# Patient Record
Sex: FEMALE | Race: WHITE | URBAN - METROPOLITAN AREA
[De-identification: names, ages, dates, MRNs, and addresses within clinical notes are randomized per-mention and may not be internally consistent; named-entity substitution may affect disease eponyms.]

---

## 2021-04-06 ENCOUNTER — OFFICE VISIT (OUTPATIENT)
Dept: ENT CLINIC | Age: 36
End: 2021-04-06
Payer: COMMERCIAL

## 2021-04-06 VITALS
BODY MASS INDEX: 19.46 KG/M2 | TEMPERATURE: 98.4 F | HEART RATE: 70 BPM | SYSTOLIC BLOOD PRESSURE: 103 MMHG | DIASTOLIC BLOOD PRESSURE: 59 MMHG | HEIGHT: 67 IN | WEIGHT: 124 LBS

## 2021-04-06 DIAGNOSIS — H90.2 EXTERNAL EAR CONDUCTIVE HEARING LOSS: ICD-10-CM

## 2021-04-06 DIAGNOSIS — H61.23 BILATERAL IMPACTED CERUMEN: Primary | ICD-10-CM

## 2021-04-06 PROCEDURE — 99203 OFFICE O/P NEW LOW 30 MIN: CPT | Performed by: OTOLARYNGOLOGY

## 2021-04-06 PROCEDURE — 69210 REMOVE IMPACTED EAR WAX UNI: CPT | Performed by: OTOLARYNGOLOGY

## 2021-04-06 RX ORDER — CHLORAL HYDRATE 500 MG
3000 CAPSULE ORAL 3 TIMES DAILY
COMMUNITY

## 2021-04-06 RX ORDER — M-VIT,TX,IRON,MINS/CALC/FOLIC 27MG-0.4MG
1 TABLET ORAL DAILY
COMMUNITY

## 2021-04-06 SDOH — HEALTH STABILITY: MENTAL HEALTH: HOW MANY STANDARD DRINKS CONTAINING ALCOHOL DO YOU HAVE ON A TYPICAL DAY?: NOT ASKED

## 2021-04-06 SDOH — HEALTH STABILITY: MENTAL HEALTH: HOW OFTEN DO YOU HAVE A DRINK CONTAINING ALCOHOL?: NOT ASKED

## 2021-04-06 NOTE — PROGRESS NOTES
CHIEF COMPLAINT: Fullness in both ears. HISTORY OF PRESENT ILLNESS:  30 y.o. female who presents with fullness in both ears for many months. Her hearing is muffled. She has some ear pain on the left side, not on the right. He has no otorrhea. PAST MEDICAL HISTORY:   Social History     Tobacco Use   Smoking Status Never Smoker   Smokeless Tobacco Never Used                                                    Social History     Substance and Sexual Activity   Alcohol Use Yes    Comment: socially                                                    Current Outpatient Medications:     Multiple Vitamins-Minerals (THERAPEUTIC MULTIVITAMIN-MINERALS) tablet, Take 1 tablet by mouth daily, Disp: , Rfl:     Omega-3 Fatty Acids (FISH OIL) 1000 MG CAPS, Take 3,000 mg by mouth 3 times daily, Disp: , Rfl:     Probiotic Product (PROBIOTIC-10 PO), Take by mouth, Disp: , Rfl:                                                  Past Medical History:   Diagnosis Date    Headache                                                   History reviewed. No pertinent surgical history. FAMILY HISTORY: Family history reviewed. Except as noted in history of present illness, there is no pertinent family history      REVIEW OF SYSTEMS:  All pertinent positive and negative review of systems included in HPI. Otherwise, all systems are reviewed and negative. PHYSICAL EXAMINATION:   GENERAL: wdwn- no acute distress  RESPIRATORY:  No stridor or respiratory distress  COMMUNICATION :  Normal voice  MENTAL STATUS:  Mood and affect normal, oriented X 3  HEAD AND FACE:  No abnormalities of the skin of face or head  EXTERNAL EARS AND NOSE:  Normal pinnae bilateral  FACIAL MUSCLES:  All branches of facial nerve intact  EXTRAOCULAR MUSCLES: Intact with full range of motion  FACE PALPATION:  No tenderness over sinuses. Zygomatic arches and orbital rims intact  OTOSCOPY: Cerumen occludes both external auditory canals.   TUNING FORKS: Rinne ++ Kendall midline at 512 Hz  INTRANASAL:  Septum midline, turbinates normal, meati clear. LIPS, TEETH, GINGIVA:  Normal mucosa  PHARYNX:  Normal  NECK:  No masses. LYMPHATIC:  No cervical adenopathy  SALIVARY GLANDS:  No swelling or masses in the parotid or submandibular salivary glands  THYROID:  No goiter or thyroid masses. IMPRESSION: Impacted cerumen in both ears. PLAN: Cerumen removed from both external auditory canals using suction. Tympanic membranes and middle ear spaces are normal.  Hearing is subjectively improved. There is a family history of hearing loss in both of her parents had hearing aids early. She will arrange for a baseline audiogram.    FOLLOW-UP: As needed.

## 2021-10-20 ENCOUNTER — OFFICE VISIT (OUTPATIENT)
Dept: INTERNAL MEDICINE CLINIC | Age: 36
End: 2021-10-20
Payer: COMMERCIAL

## 2021-10-20 VITALS
OXYGEN SATURATION: 99 % | DIASTOLIC BLOOD PRESSURE: 80 MMHG | TEMPERATURE: 98.2 F | SYSTOLIC BLOOD PRESSURE: 110 MMHG | BODY MASS INDEX: 19.62 KG/M2 | HEART RATE: 68 BPM | WEIGHT: 125 LBS | HEIGHT: 67 IN

## 2021-10-20 DIAGNOSIS — Z20.828 CONTACT W AND EXPOSURE TO OTH VIRAL COMMUNICABLE DISEASES: ICD-10-CM

## 2021-10-20 DIAGNOSIS — G56.01 CARPAL TUNNEL SYNDROME OF RIGHT WRIST: ICD-10-CM

## 2021-10-20 DIAGNOSIS — Z12.4 CERVICAL CANCER SCREENING: ICD-10-CM

## 2021-10-20 DIAGNOSIS — Z00.00 WELL ADULT EXAM: Primary | ICD-10-CM

## 2021-10-20 DIAGNOSIS — Z00.00 WELL ADULT EXAM: ICD-10-CM

## 2021-10-20 LAB
A/G RATIO: 1.7 (ref 1.1–2.2)
ALBUMIN SERPL-MCNC: 4.6 G/DL (ref 3.4–5)
ALP BLD-CCNC: 42 U/L (ref 40–129)
ALT SERPL-CCNC: 13 U/L (ref 10–40)
ANION GAP SERPL CALCULATED.3IONS-SCNC: 13 MMOL/L (ref 3–16)
AST SERPL-CCNC: 20 U/L (ref 15–37)
BILIRUB SERPL-MCNC: 0.4 MG/DL (ref 0–1)
BUN BLDV-MCNC: 11 MG/DL (ref 7–20)
CALCIUM SERPL-MCNC: 9.3 MG/DL (ref 8.3–10.6)
CHLORIDE BLD-SCNC: 100 MMOL/L (ref 99–110)
CHOLESTEROL, TOTAL: 202 MG/DL (ref 0–199)
CO2: 24 MMOL/L (ref 21–32)
CREAT SERPL-MCNC: 0.8 MG/DL (ref 0.6–1.1)
GFR AFRICAN AMERICAN: >60
GFR NON-AFRICAN AMERICAN: >60
GLOBULIN: 2.7 G/DL
GLUCOSE BLD-MCNC: 94 MG/DL (ref 70–99)
HDLC SERPL-MCNC: 85 MG/DL (ref 40–60)
HEPATITIS C ANTIBODY INTERPRETATION: NORMAL
LDL CHOLESTEROL CALCULATED: 110 MG/DL
POTASSIUM SERPL-SCNC: 4.4 MMOL/L (ref 3.5–5.1)
SARS-COV-2 ANTIBODY, TOTAL: NEGATIVE
SODIUM BLD-SCNC: 137 MMOL/L (ref 136–145)
TOTAL PROTEIN: 7.3 G/DL (ref 6.4–8.2)
TRIGL SERPL-MCNC: 35 MG/DL (ref 0–150)
TSH REFLEX: 1.8 UIU/ML (ref 0.27–4.2)
VLDLC SERPL CALC-MCNC: 7 MG/DL

## 2021-10-20 PROCEDURE — 99385 PREV VISIT NEW AGE 18-39: CPT | Performed by: INTERNAL MEDICINE

## 2021-10-20 SDOH — ECONOMIC STABILITY: TRANSPORTATION INSECURITY
IN THE PAST 12 MONTHS, HAS THE LACK OF TRANSPORTATION KEPT YOU FROM MEDICAL APPOINTMENTS OR FROM GETTING MEDICATIONS?: NO

## 2021-10-20 SDOH — ECONOMIC STABILITY: FOOD INSECURITY: WITHIN THE PAST 12 MONTHS, YOU WORRIED THAT YOUR FOOD WOULD RUN OUT BEFORE YOU GOT MONEY TO BUY MORE.: NEVER TRUE

## 2021-10-20 SDOH — HEALTH STABILITY: PHYSICAL HEALTH: ON AVERAGE, HOW MANY MINUTES DO YOU ENGAGE IN EXERCISE AT THIS LEVEL?: 30 MIN

## 2021-10-20 SDOH — HEALTH STABILITY: PHYSICAL HEALTH: ON AVERAGE, HOW MANY DAYS PER WEEK DO YOU ENGAGE IN MODERATE TO STRENUOUS EXERCISE (LIKE A BRISK WALK)?: 3 DAYS

## 2021-10-20 SDOH — ECONOMIC STABILITY: TRANSPORTATION INSECURITY
IN THE PAST 12 MONTHS, HAS LACK OF TRANSPORTATION KEPT YOU FROM MEETINGS, WORK, OR FROM GETTING THINGS NEEDED FOR DAILY LIVING?: NO

## 2021-10-20 SDOH — ECONOMIC STABILITY: HOUSING INSECURITY
IN THE LAST 12 MONTHS, WAS THERE A TIME WHEN YOU DID NOT HAVE A STEADY PLACE TO SLEEP OR SLEPT IN A SHELTER (INCLUDING NOW)?: NO

## 2021-10-20 SDOH — ECONOMIC STABILITY: INCOME INSECURITY: IN THE LAST 12 MONTHS, WAS THERE A TIME WHEN YOU WERE NOT ABLE TO PAY THE MORTGAGE OR RENT ON TIME?: NO

## 2021-10-20 SDOH — ECONOMIC STABILITY: FOOD INSECURITY: WITHIN THE PAST 12 MONTHS, THE FOOD YOU BOUGHT JUST DIDN'T LAST AND YOU DIDN'T HAVE MONEY TO GET MORE.: NEVER TRUE

## 2021-10-20 ASSESSMENT — SOCIAL DETERMINANTS OF HEALTH (SDOH)
WITHIN THE LAST YEAR, HAVE TO BEEN RAPED OR FORCED TO HAVE ANY KIND OF SEXUAL ACTIVITY BY YOUR PARTNER OR EX-PARTNER?: NO
HOW HARD IS IT FOR YOU TO PAY FOR THE VERY BASICS LIKE FOOD, HOUSING, MEDICAL CARE, AND HEATING?: NOT HARD AT ALL
WITHIN THE LAST YEAR, HAVE YOU BEEN AFRAID OF YOUR PARTNER OR EX-PARTNER?: NO
IN A TYPICAL WEEK, HOW MANY TIMES DO YOU TALK ON THE PHONE WITH FAMILY, FRIENDS, OR NEIGHBORS?: MORE THAN THREE TIMES A WEEK
WITHIN THE LAST YEAR, HAVE YOU BEEN KICKED, HIT, SLAPPED, OR OTHERWISE PHYSICALLY HURT BY YOUR PARTNER OR EX-PARTNER?: NO
HOW OFTEN DO YOU ATTEND CHURCH OR RELIGIOUS SERVICES?: MORE THAN 4 TIMES PER YEAR
DO YOU BELONG TO ANY CLUBS OR ORGANIZATIONS SUCH AS CHURCH GROUPS UNIONS, FRATERNAL OR ATHLETIC GROUPS, OR SCHOOL GROUPS?: NO
HOW OFTEN DO YOU GET TOGETHER WITH FRIENDS OR RELATIVES?: ONCE A WEEK
WITHIN THE LAST YEAR, HAVE YOU BEEN HUMILIATED OR EMOTIONALLY ABUSED IN OTHER WAYS BY YOUR PARTNER OR EX-PARTNER?: NO

## 2021-10-20 ASSESSMENT — PATIENT HEALTH QUESTIONNAIRE - PHQ9
SUM OF ALL RESPONSES TO PHQ9 QUESTIONS 1 & 2: 0
SUM OF ALL RESPONSES TO PHQ QUESTIONS 1-9: 0
SUM OF ALL RESPONSES TO PHQ QUESTIONS 1-9: 0
DEPRESSION UNABLE TO ASSESS: PT REFUSES
2. FEELING DOWN, DEPRESSED OR HOPELESS: 0
SUM OF ALL RESPONSES TO PHQ QUESTIONS 1-9: 0
1. LITTLE INTEREST OR PLEASURE IN DOING THINGS: 0

## 2021-10-20 ASSESSMENT — ENCOUNTER SYMPTOMS
SINUS PRESSURE: 0
COUGH: 0
ABDOMINAL PAIN: 0
CONSTIPATION: 0
SHORTNESS OF BREATH: 0
DIARRHEA: 0
BACK PAIN: 0
SINUS PAIN: 0

## 2021-10-20 ASSESSMENT — LIFESTYLE VARIABLES
HOW OFTEN DO YOU HAVE A DRINK CONTAINING ALCOHOL: MONTHLY OR LESS
HOW MANY STANDARD DRINKS CONTAINING ALCOHOL DO YOU HAVE ON A TYPICAL DAY: 1 OR 2

## 2021-10-20 NOTE — PROGRESS NOTES
10/20/2021    Douglas Lujan (:  1985) marta 29 y.o. female, here for a preventive medicine evaluation. There is no problem list on file for this patient. She thinks that she has carpal tunnel in the right wrist - can get pain/burning in the right hand, numbness sometimes. Sleeping with a wrist brace has helped significantly. She also rest and use the brace if she is active and using her hands a lot. Last pap - 2 years since last visit, not sure if there was a pap, maybe 3 years since pap. No history of abnormal pap. Last tdap in the last 6-8 years    Review of Systems   Constitutional: Negative for fatigue and unexpected weight change. HENT: Negative for sinus pressure and sinus pain. Eyes: Negative for visual disturbance. Respiratory: Negative for cough and shortness of breath. Cardiovascular: Negative for chest pain and leg swelling. Gastrointestinal: Negative for abdominal pain, constipation and diarrhea. Genitourinary: Negative for difficulty urinating and menstrual problem. Musculoskeletal: Negative for arthralgias and back pain. Skin: Negative for rash. Neurological: Negative for weakness and light-headedness. Psychiatric/Behavioral: Negative for dysphoric mood. The patient is not nervous/anxious. Prior to Visit Medications    Medication Sig Taking?  Authorizing Provider   Multiple Vitamins-Minerals (THERAPEUTIC MULTIVITAMIN-MINERALS) tablet Take 1 tablet by mouth daily Yes Historical Provider, MD   Omega-3 Fatty Acids (FISH OIL) 1000 MG CAPS Take 3,000 mg by mouth 3 times daily Yes Historical Provider, MD   Probiotic Product (PROBIOTIC-10 PO) Take by mouth Yes Historical Provider, MD        Allergies   Allergen Reactions    Oxycodone        Past Medical History:   Diagnosis Date    Headache        Past Surgical History:   Procedure Laterality Date    HYSTEROSCOPY         Social History     Socioeconomic History    Marital status:      Spouse name: Not on file    Number of children: Not on file    Years of education: Not on file    Highest education level: Not on file   Occupational History    Not on file   Tobacco Use    Smoking status: Never Smoker    Smokeless tobacco: Never Used   Substance and Sexual Activity    Alcohol use: Yes     Comment: socially    Drug use: Never    Sexual activity: Not on file   Other Topics Concern    Not on file   Social History Narrative    Not on file     Social Determinants of Health     Financial Resource Strain: Low Risk     Difficulty of Paying Living Expenses: Not hard at all   Food Insecurity: No Food Insecurity    Worried About Running Out of Food in the Last Year: Never true    Bell of Food in the Last Year: Never true   Transportation Needs: No Transportation Needs    Lack of Transportation (Medical): No    Lack of Transportation (Non-Medical): No   Physical Activity: Insufficiently Active    Days of Exercise per Week: 3 days    Minutes of Exercise per Session: 30 min   Stress: No Stress Concern Present    Feeling of Stress : Not at all   Social Connections:  Moderately Integrated    Frequency of Communication with Friends and Family: More than three times a week    Frequency of Social Gatherings with Friends and Family: Once a week    Attends Mandaen Services: More than 4 times per year    Active Member of Clubs or Organizations: No    Attends Club or Organization Meetings: Not on file    Marital Status:    Intimate Partner Violence: Not At Risk    Fear of Current or Ex-Partner: No    Emotionally Abused: No    Physically Abused: No    Sexually Abused: No        Family History   Problem Relation Age of Onset    Thyroid Disease Mother         hypothyroidism    Seizures Sister     Diabetes Maternal Grandmother     Heart Disease Maternal Grandfather     Heart Surgery Maternal Grandfather     Pancreatic Cancer Paternal Grandfather     Breast Cancer Paternal Aunt     Thyroid Disease Maternal Aunt         hyperthyroidism       ADVANCEDIRECTIVE: N, <no information>    Vitals:    10/20/21 0956   BP: 110/80   Pulse: 68   Temp: 98.2 °F (36.8 °C)   SpO2: 99%   Weight: 125 lb (56.7 kg)   Height: 5' 6.75\" (1.695 m)     Estimated body mass index is 19.72 kg/m² as calculated from the following:    Height as of this encounter: 5' 6.75\" (1.695 m). Weight as of this encounter: 125 lb (56.7 kg). Physical Exam  Vitals reviewed. Exam conducted with a chaperone present. Constitutional:       General: She is not in acute distress. Appearance: Normal appearance. She is well-developed. HENT:      Head: Normocephalic and atraumatic. Right Ear: Tympanic membrane, ear canal and external ear normal.      Left Ear: Tympanic membrane, ear canal and external ear normal.   Eyes:      General: No scleral icterus. Conjunctiva/sclera: Conjunctivae normal.   Neck:      Thyroid: No thyroid mass, thyromegaly or thyroid tenderness. Vascular: No carotid bruit. Cardiovascular:      Rate and Rhythm: Normal rate and regular rhythm. Heart sounds: Normal heart sounds. Pulmonary:      Effort: Pulmonary effort is normal. No respiratory distress. Breath sounds: Normal breath sounds. Abdominal:      General: Abdomen is flat. Bowel sounds are normal. There is no distension. Palpations: Abdomen is soft. There is no mass. Tenderness: There is no abdominal tenderness. Hernia: No hernia is present. Genitourinary:     General: Normal vulva. Vagina: Normal.      Cervix: Cervical bleeding present. Uterus: Normal.       Adnexa: Right adnexa normal and left adnexa normal.   Musculoskeletal:      Cervical back: Neck supple. Right lower leg: No edema. Left lower leg: No edema. Lymphadenopathy:      Cervical: No cervical adenopathy. Skin:     General: Skin is warm and dry. Neurological:      General: No focal deficit present.       Mental Status: She is alert and oriented to person, place, and time. Psychiatric:         Mood and Affect: Mood normal.         Behavior: Behavior normal.         Thought Content: Thought content normal.         Judgment: Judgment normal.         No flowsheet data found. No results found for: CHOL, CHOLFAST, TRIG, TRIGLYCFAST, HDL, LDLCHOLESTEROL, LDLCALC, GLUF, GLUCOSE, LABA1C    The ASCVD Risk score (Deanna Barnes, et al., 2013) failed to calculate for the following reasons: The 2013 ASCVD risk score is only valid for ages 36 to 78      There is no immunization history on file for this patient. Health Maintenance   Topic Date Due    Hepatitis C screen  Never done    COVID-19 Vaccine (1) Never done    HIV screen  Never done    DTaP/Tdap/Td vaccine (1 - Tdap) Never done    Cervical cancer screen  Never done    Flu vaccine (1) Never done    Hepatitis A vaccine  Aged Out    Hepatitis B vaccine  Aged Out    Hib vaccine  Aged Out    Meningococcal (ACWY) vaccine  Aged Out    Pneumococcal 0-64 years Vaccine  Aged Out    Varicella vaccine  Discontinued       ASSESSMENT/PLAN:  1. Well adult exam  - Discussed age appropriate preventive care including healthy diet, daily exercise, immunizations and age & gender guided screening tests. - Comprehensive Metabolic Panel; Future  - Lipid Panel; Future  - TSH with Reflex; Future  - HIV Screen; Future  - Hepatitis C Antibody; Future    2. Contact w and exposure to oth viral communicable diseases  - Covid-19, Antibody, Total; Future    3. Cervical cancer screening  - just starting menses, pap sent, may need to be repeated but will see results  - PAP SMEAR    4. Carpal tunnel syndrome of right wrist  -Symptoms improved with conservative measures, discussed start injection and surgery, if symptoms are progressing can pursue that    Return in about 1 year (around 10/20/2022) for CPE.

## 2021-10-21 LAB
HIV AG/AB: NORMAL
HIV ANTIGEN: NORMAL
HIV-1 ANTIBODY: NORMAL
HIV-2 AB: NORMAL

## 2022-02-04 ENCOUNTER — TELEPHONE (OUTPATIENT)
Dept: INTERNAL MEDICINE CLINIC | Age: 37
End: 2022-02-04

## 2022-02-04 NOTE — TELEPHONE ENCOUNTER
----- Message from Gunnarrukhsana Nicholsoner sent at 2/4/2022 10:09 AM EST -----  Subject: Message to Provider    QUESTIONS  Information for Provider? Pt needs a negative covid test or a blood test   showing she has antibodies from when she Covid in December. Could you put   in orders for her to get antibody test. Please advise where to get   bloodwork done. Please call to advise.  ---------------------------------------------------------------------------  --------------  CALL BACK INFO  What is the best way for the office to contact you? OK to leave message on   voicemail  Preferred Call Back Phone Number? [phone]  ---------------------------------------------------------------------------  --------------  SCRIPT ANSWERS  Relationship to Patient?  Self

## 2022-02-10 ENCOUNTER — TELEPHONE (OUTPATIENT)
Dept: INTERNAL MEDICINE CLINIC | Age: 37
End: 2022-02-10

## 2022-02-10 NOTE — TELEPHONE ENCOUNTER
Patient is calling regarding a question she has    Patient is on day 3 with excessive itching all over from head to toe  Itching last all day    She was concerned because she  Didn't know if she should make an appt with doctor     Please advise and call

## 2022-02-11 NOTE — TELEPHONE ENCOUNTER
Can put her in Monday 10:20 and over the weekend she can take cetirizine 10mg twice daily, or urgent care

## 2022-02-22 ENCOUNTER — TELEPHONE (OUTPATIENT)
Dept: INTERNAL MEDICINE CLINIC | Age: 37
End: 2022-02-22

## 2022-02-22 NOTE — TELEPHONE ENCOUNTER
Patient called, on day 16 of itching (without a rash) from head to toe. Tried Benadryl and Zyrtec, they did not help. Has not changed any detergents, topicals, or other medications. She saw a dermatologist on Feb 11 th. Last Monday (02/14) the doctor ordered labs for her. She got the labs done on (02/18). Results were all in normal ranges (see lab results). Please ask Dr. Oumar Kendrick to return her call.

## 2022-02-22 NOTE — TELEPHONE ENCOUNTER
Not sure if she's asking next steps - would need to see her, I can't really treat it without seeing her

## 2022-02-23 ENCOUNTER — OFFICE VISIT (OUTPATIENT)
Dept: INTERNAL MEDICINE CLINIC | Age: 37
End: 2022-02-23
Payer: COMMERCIAL

## 2022-02-23 VITALS
HEIGHT: 66 IN | HEART RATE: 77 BPM | OXYGEN SATURATION: 100 % | BODY MASS INDEX: 20.57 KG/M2 | SYSTOLIC BLOOD PRESSURE: 128 MMHG | DIASTOLIC BLOOD PRESSURE: 72 MMHG | WEIGHT: 128 LBS | TEMPERATURE: 98.5 F

## 2022-02-23 DIAGNOSIS — L29.9 GENERALIZED PRURITUS: Primary | ICD-10-CM

## 2022-02-23 PROCEDURE — 99213 OFFICE O/P EST LOW 20 MIN: CPT | Performed by: HOSPITALIST

## 2022-02-23 RX ORDER — HYDROXYZINE HYDROCHLORIDE 25 MG/1
50 TABLET, FILM COATED ORAL EVERY 6 HOURS PRN
Qty: 90 TABLET | Refills: 0 | Status: SHIPPED | OUTPATIENT
Start: 2022-02-23 | End: 2022-03-29 | Stop reason: ALTCHOICE

## 2022-02-23 ASSESSMENT — ENCOUNTER SYMPTOMS
SINUS PRESSURE: 0
CHEST TIGHTNESS: 0
WHEEZING: 0
COUGH: 0
VOMITING: 0
NAUSEA: 0
ABDOMINAL DISTENTION: 0
CONSTIPATION: 0
DIARRHEA: 0
VOICE CHANGE: 0
SINUS PAIN: 0
BACK PAIN: 0
BLOOD IN STOOL: 0
ABDOMINAL PAIN: 0
TROUBLE SWALLOWING: 0
SHORTNESS OF BREATH: 0
SORE THROAT: 0

## 2022-02-23 NOTE — PROGRESS NOTES
[facility]  Follow-up care visit   2022    Patient:  Albertina Lai                                               : 1985  Age: 29 y.o. MRN: <[id number]>  Date : 2022    Albertina Lai is a 29 y.o. female who presents for :      She is here for day #17of generalized pruritis without any rash whatsoever. Labs are completely normal without eosiniphilia, bilirubinemia or other abnormality. She uses vitamins and pro-biotic only. No new OTC meds, detergents,  lotions, shampoos, or other cosmetics. Her sx are refractory to Benadryl and Zyrtec. She saw a dermatologist on  who prescribed triamcinolone. This was unhelpful. Chief Complaint   Patient presents with    Other     itching all over X 16 days, no rash present        Review of Systems   Constitutional: Negative for activity change, appetite change, chills, diaphoresis, fatigue, fever and unexpected weight change. HENT: Negative for sinus pressure, sinus pain, sore throat, trouble swallowing and voice change. Eyes: Negative for visual disturbance. Respiratory: Negative for cough, chest tightness, shortness of breath and wheezing. Cardiovascular: Negative for chest pain, palpitations and leg swelling. Gastrointestinal: Negative for abdominal distention, abdominal pain, blood in stool, constipation, diarrhea, nausea and vomiting. Endocrine: Negative for polydipsia and polyphagia. Genitourinary: Negative for decreased urine volume, difficulty urinating, dysuria and urgency. Musculoskeletal: Negative for back pain, gait problem, joint swelling and myalgias. Neurological: Negative for dizziness, seizures, syncope, light-headedness and headaches. Psychiatric/Behavioral: Negative for agitation, behavioral problems, confusion and suicidal ideas.          Past Medical History:   Diagnosis Date    Headache        Past Surgical History:   Procedure Laterality Date    HYSTEROSCOPY         Current Outpatient Medications   Medication Sig Dispense Refill    hydrOXYzine (ATARAX) 25 MG tablet Take 2 tablets by mouth every 6 hours as needed for Itching 90 tablet 0    Multiple Vitamins-Minerals (THERAPEUTIC MULTIVITAMIN-MINERALS) tablet Take 1 tablet by mouth daily      Omega-3 Fatty Acids (FISH OIL) 1000 MG CAPS Take 3,000 mg by mouth 3 times daily      Probiotic Product (PROBIOTIC-10 PO) Take by mouth       No current facility-administered medications for this visit. /72   Pulse 77   Temp 98.5 °F (36.9 °C)   Ht 5' 6\" (1.676 m)   Wt 128 lb (58.1 kg)   SpO2 100%   BMI 20.66 kg/m²     Physical Exam   Physical Exam  Vitals reviewed. Constitutional:       General: She is not in acute distress. Appearance: Normal appearance. HENT:      Head: Normocephalic and atraumatic. Mouth/Throat:      Pharynx: Oropharynx is clear. Eyes:      Conjunctiva/sclera: Conjunctivae normal.      Pupils: Pupils are equal, round, and reactive to light. Cardiovascular:      Rate and Rhythm: Normal rate and regular rhythm. Pulses: Normal pulses. Heart sounds: Normal heart sounds. Pulmonary:      Effort: Pulmonary effort is normal. No respiratory distress. Breath sounds: Normal breath sounds. No wheezing or rales. Abdominal:      Palpations: Abdomen is soft. Tenderness: There is no abdominal tenderness. There is no rebound. Musculoskeletal:         General: No signs of injury. Normal range of motion. Cervical back: Normal range of motion and neck supple. Skin:     General: Skin is warm and dry. Coloration: Skin is not jaundiced. Neurological:      General: No focal deficit present. Mental Status: She is alert and oriented to person, place, and time. Psychiatric:         Behavior: Behavior normal.         Thought Content: Thought content normal.         Judgment: Judgment normal.         Assessment/ plan:     1.  Generalized pruritus  - hydrOXYzine (ATARAX) 25 MG tablet; Take 2 tablets by mouth every 6 hours as needed for Itching  Dispense: 90 tablet; Refill: 0       No follow-ups on file.     Viktoria Roman MD

## 2022-03-28 ENCOUNTER — TELEPHONE (OUTPATIENT)
Dept: INTERNAL MEDICINE CLINIC | Age: 37
End: 2022-03-28

## 2022-03-28 NOTE — TELEPHONE ENCOUNTER
----- Message from Shiela Lai sent at 3/28/2022  9:00 AM EDT -----  Subject: Appointment Request    Reason for Call: Routine (Patient Request) No Script    QUESTIONS  Type of Appointment? Established Patient  Reason for appointment request? Available appointments did not meet   patient need  Additional Information for Provider? Patient has been having some   intestinal troubles, feels sick almost everyday, diarrhea very often,   allergies? Needs appt sooner than the 05/09 that is avail. Please call pt   to appoint. Screened green 03/28  ---------------------------------------------------------------------------  --------------  CALL BACK INFO  What is the best way for the office to contact you? OK to leave message on   voicemail  Preferred Call Back Phone Number? [phone]  ---------------------------------------------------------------------------  --------------  SCRIPT ANSWERS  Relationship to Patient? Self  Do you have pain that has started or worsened within the past 24 hours? No  Are you vomiting blood or have bloody or black stool? No  Have you recently (14 days) seen a provider for this pain? No  Have you been diagnosed with, awaiting test results for, or told that you   are suspected of having COVID-19 (Coronavirus)? (If patient has tested   negative or was tested as a requirement for work, school, or travel and   not based on symptoms, answer no)? No  Within the past 10 days have you developed any of the following symptoms   (answer no if symptoms have been present longer than 10 days or began   more than 10 days ago)? Fever or Chills, Cough, Shortness of breath or   difficulty breathing, Loss of taste or smell, Sore throat, Nasal   congestion, Sneezing or runny nose, Fatigue or generalized body aches   (answer no if pain is specific to a body part e.g. back pain), Diarrhea,   Headache? No  Have you had close contact with someone with COVID-19 in the last 7 days?    No  (Service Expert  click yes below to proceed with Ambrose Quiroz As Usual   Scheduling)? Yes  (Is the patient requesting to see the provider for a procedure?)? No  (Is the patient requesting to see the provider urgently  today or   tomorrow. )?  No

## 2022-03-29 ENCOUNTER — TELEMEDICINE (OUTPATIENT)
Dept: INTERNAL MEDICINE CLINIC | Age: 37
End: 2022-03-29
Payer: COMMERCIAL

## 2022-03-29 DIAGNOSIS — R10.84 GENERALIZED ABDOMINAL PAIN: ICD-10-CM

## 2022-03-29 DIAGNOSIS — R14.3 FLATULENCE: ICD-10-CM

## 2022-03-29 DIAGNOSIS — R10.84 GENERALIZED ABDOMINAL PAIN: Primary | ICD-10-CM

## 2022-03-29 DIAGNOSIS — R19.7 DIARRHEA, UNSPECIFIED TYPE: ICD-10-CM

## 2022-03-29 LAB — IGA: 233 MG/DL (ref 70–400)

## 2022-03-29 PROCEDURE — 99214 OFFICE O/P EST MOD 30 MIN: CPT | Performed by: INTERNAL MEDICINE

## 2022-03-29 ASSESSMENT — ENCOUNTER SYMPTOMS
ABDOMINAL PAIN: 0
SINUS PRESSURE: 0
WHEEZING: 0
RHINORRHEA: 0
NAUSEA: 0
EYE PAIN: 0
COUGH: 0
CHEST TIGHTNESS: 0
TROUBLE SWALLOWING: 0
VOMITING: 0
BLOOD IN STOOL: 0
SINUS PAIN: 0
SHORTNESS OF BREATH: 0
EYE DISCHARGE: 0
SORE THROAT: 0

## 2022-03-29 NOTE — PROGRESS NOTES
Francheska Silverio (:  1985) is a Established patient, here for evaluation of the following:    Assessment & Plan   Below is the assessment and plan developed based on review of pertinent history, physical exam, labs, studies, and medications. 1. Generalized abdominal pain  -     Celiac Screen with Reflex; Future  -     BLAKE Urrutia MD, Gastroenter[facility] ([facility]  2. Diarrhea, unspecified type  -     Celiac Screen with Reflex; Future  -     BLAKE Urrutia MD, Gastroenterology ([facility]  3. Flatulence  -     Celiac Screen with Reflex; Future  -     BLAKE Urrutia MD, Gastroenterology ([facility]), [facility]  Blood test    Food diary. No sour dough bread    45 kcal bread    No lactose     Probiotic tab qd and [place] / greek yogurt. 64 oz water or more w diarrhea    Stomach specialist if not better    3 miles elliptical machine 3-4 days a wk    See Dr Cade Foster  f/u      Return if symptoms worsen or fail to improve, for f/u w Dr Juan Antonio Kohler. Subjective   Stomach sensitive to food--what foods--Not sure --? Lactose  Scrambled egg sourdough bread coffee  Dinner-chicken potato green beans  Lunch-blue berries /chicken salad--dairy free buffalo chicken on almond crackers    Stomach pain all over--labs reviewed from [facility], [facility] normal-so no labs ordered at this time    Diarrhea--4-5 BM a day--soft or diarrhea--smells burnt --stool    Itching generalized --now --not bad--gets periodically---nothing on skin---Not sure if drinking water 64 oz or not--house temp 68-good- tide regular-not good    Review of Systems   Constitutional: Negative for appetite change, chills, fever and unexpected weight change. HENT: Negative for congestion, ear discharge, ear pain, nosebleeds, rhinorrhea, sinus pressure, sinus pain, sore throat and trouble swallowing. Eyes: Negative for pain and discharge.    Respiratory: Negative for cough, chest tightness, shortness of breath and wheezing. Cardiovascular: Negative for chest pain, palpitations and leg swelling. Gastrointestinal: Negative for abdominal pain, blood in stool, nausea and vomiting. Endocrine: Negative for polydipsia and polyphagia. Genitourinary: Negative for difficulty urinating, enuresis, flank pain and hematuria. Musculoskeletal: Negative for myalgias. Skin: Negative for rash. Neurological: Negative for facial asymmetry, weakness, light-headedness, numbness and headaches. Psychiatric/Behavioral: Negative for confusion.           Objective   Patient-Reported Vitals  Patient-Reported Weight: 125 lbs  Patient-Reported Height: 5 6       Physical Exam  [INSTRUCTIONS:  \"[x]\" Indicates a positive item  \"[]\" Indicates a negative item  -- DELETE ALL ITEMS NOT EXAMINED]    Constitutional: [x] Appears well-developed and well-nourished [x] No apparent distress      [] Abnormal -     Mental status: [x] Alert and awake  [x] Oriented to person/place/time [x] Able to follow commands    [] Abnormal -     Eyes:   EOM    [x]  Normal    [] Abnormal -   Sclera  [x]  Normal    [] Abnormal -          Discharge [x]  None visible   [] Abnormal -     HENT: [x] Normocephalic, atraumatic  [] Abnormal -   [x] Mouth/Throat: Mucous membranes are moist    External Ears [x] Normal  [] Abnormal -    Neck: [x] No visualized mass [] Abnormal -     Pulmonary/Chest: [x] Respiratory effort normal   [x] No visualized signs of difficulty breathing or respiratory distress        [] Abnormal -      Musculoskeletal:   [x] Normal gait with no signs of ataxia         [x] Normal range of motion of neck        [] Abnormal -   Vague abdominal generalized discomfort off-and-on  Neurological:        [x] No Facial Asymmetry (Cranial nerve 7 motor function) (limited exam due to video visit)          [x] No gaze palsy        [] Abnormal -          Skin:        [x] No significant exanthematous lesions or discoloration noted on facial skin         [] Abnormal -            Psychiatric:       [x] Normal Affect [] Abnormal -        [x] No Hallucinations    Other pertinent observable physical exam findings:-             On this date 3/29/2022 I have spent 24 minutes reviewing previous notes, test results and face to face (virtual) with the patient discussing the diagnosis and importance of compliance with the treatment plan as well as documenting on the day of the visit. Timi Hector, was evaluated through a synchronous (real-time) audio-video encounter. The patient (or guardian if applicable) is aware that this is a billable service, which includes applicable co-pays. This Virtual Visit was conducted with patient's (and/or legal guardian's) consent. The visit was conducted pursuant to the emergency declaration under the [address] [place] [address], [address] [place] [address] [place] authority and the [employer] and [employer] General Act. Patient identification was verified, and a caregiver was present when appropriate. The patient was located at home in a state where the provider was licensed to provide care.        --Elyse Young MD

## 2022-03-29 NOTE — PATIENT INSTRUCTIONS
Blood test-celiac test    Food diary. No sour dough bread    45 kcal bread    No lactose     Probiotic tab qd and [place] / greek yogurt.     64 oz water or more w diarrhea    Stomach specialist if not better    3 miles elliptical machine 3-4 days a wk    See Dr David Rice  f/u

## 2022-03-30 DIAGNOSIS — R10.84 GENERALIZED ABDOMINAL PAIN: Primary | ICD-10-CM

## 2022-03-30 DIAGNOSIS — R14.3 FLATULENCE: ICD-10-CM

## 2022-03-30 DIAGNOSIS — R19.7 DIARRHEA, UNSPECIFIED TYPE: ICD-10-CM

## 2022-03-30 LAB — TISSUE TRANSGLUTAMINASE IGA: <0.5 U/ML (ref 0–14)

## 2022-04-04 LAB — MISCELLANEOUS LAB TEST RESULT: NORMAL

## 2022-04-05 LAB — MISCELLANEOUS LAB TEST ORDER: NORMAL

## 2022-04-08 ENCOUNTER — TELEPHONE (OUTPATIENT)
Dept: INTERNAL MEDICINE CLINIC | Age: 37
End: 2022-04-08

## 2022-04-08 NOTE — TELEPHONE ENCOUNTER
----- Message from Joan Torres sent at 4/8/2022 12:13 PM EDT -----  Subject: Results Request    QUESTIONS  Which lab or imaging result is the patient calling about? bloodwork  Which provider ordered the test? Mikey Walsh   At what location was the test [place] [facility]  Date the test was performed? 2022-03-29  Additional Information for Provider? She was unable to read her mychart   results and would like a call back with an explanation  ---------------------------------------------------------------------------  --------------  CALL BACK INFO  What is the best way for the office to contact you? OK to leave message on   voicemail  Preferred Call Back Phone Number? [phone]  ---------------------------------------------------------------------------  --------------  SCRIPT ANSWERS  Relationship to Patient?  Self

## 2022-09-01 ENCOUNTER — OFFICE VISIT (OUTPATIENT)
Dept: ORTHOPEDIC SURGERY | Age: 37
End: 2022-09-01
Payer: COMMERCIAL

## 2022-09-01 VITALS — HEIGHT: 66 IN | BODY MASS INDEX: 20.57 KG/M2 | WEIGHT: 128 LBS

## 2022-09-01 DIAGNOSIS — R52 PAIN: Primary | ICD-10-CM

## 2022-09-01 DIAGNOSIS — M25.852 IMPINGEMENT SYNDROME, HIP, LEFT: ICD-10-CM

## 2022-09-01 PROCEDURE — 99204 OFFICE O/P NEW MOD 45 MIN: CPT | Performed by: ORTHOPAEDIC SURGERY

## 2022-09-01 RX ORDER — MELOXICAM 15 MG/1
15 TABLET ORAL DAILY
Qty: 30 TABLET | Refills: 0 | Status: SHIPPED | OUTPATIENT
Start: 2022-09-01

## 2022-09-01 NOTE — PROGRESS NOTES
Dr Vandana Graf      Date /Time 9/1/2022       10:01 AM EDT  Name Travis Kent             1985   Location  [address]  MRN [id number]                Chief Complaint   Patient presents with    Hip Pain     Left         History of Present Illness    Travis Kent is a 40 y.o. female who presents with  left hip pain. Sent in consultation by Emiliano Miguel MD, . Occupation:  Stay-at-home mom  Occupational activities: heavy lifting occasionally. Athletic/exercise activity: no sports. Injury Mechanism:  none. Worker's Comp. & legal issues:   none. Previous Treatments: Ice, Heat, and NSAIDs    Patient presents to the office today for a new problem. Patient here with a chief complaint of left hip pain. Patient's left hip has been painful off and on for years. She complains of stiffness. Her symptoms have increased over the last several weeks without injury or trauma. Pain mostly concentrated over the anterior-lateral aspect of the hip. Increased pain with hip rotation. No increase in pain with weightbearing activities. She denies lumbar pain or radicular symptoms.     Past History  Past Medical History:   Diagnosis Date    Headache      Past Surgical History:   Procedure Laterality Date    HYSTEROSCOPY       Family History   Problem Relation Age of Onset    Thyroid Disease Mother         hypothyroidism    Seizures Sister     Diabetes Maternal Grandmother     Heart Disease Maternal Grandfather     Heart Surgery Maternal Grandfather     Pancreatic Cancer Paternal Grandfather     Breast Cancer Paternal Aunt     Thyroid Disease Maternal Aunt         hyperthyroidism     Social History     Tobacco Use    Smoking status: Never    Smokeless tobacco: Never   Substance Use Topics    Alcohol use: Yes     Comment: socially      Current Outpatient Medications on File Prior to Visit   Medication Sig Dispense Refill    Multiple Vitamins-Minerals (THERAPEUTIC MULTIVITAMIN-MINERALS) tablet Take 1 tablet by mouth daily      Omega-3 Fatty Acids (FISH OIL) 1000 MG CAPS Take 3,000 mg by mouth 3 times daily      Probiotic Product (PROBIOTIC-10 PO) Take by mouth       No current facility-administered medications on file prior to visit. ASCVD 10-YEAR RISK SCORE  The ASCVD Risk score (Sugar Singleton., et al., 2013) failed to calculate for the following reasons: The 2013 ASCVD risk score is only valid for ages 36 to 78   . Review of Systems  10-point ROS is negative other than HPI. Physical Exam  Based off 1997 Exam Criteria  Ht 5' 6\" (1.676 m)   Wt 128 lb (58.1 kg)   BMI 20.66 kg/m²      Constitutional:       General: He is not in acute distress. Appearance: Normal appearance. Cardiovascular:      Rate and Rhythm: Normal rate and regular rhythm. Pulses: Normal pulses. Pulmonary:      Effort: Pulmonary effort is normal. No respiratory distress. Neurological:      Mental Status: He is alert and oriented to person, place, and time. Mental status is at baseline. Musculoskeletal:  Gait:  normal    Skin: Clean and intact.   No open sores or wounds    Lymphatics: No palpable lymph nodes    Spine / Hip Exam:      RIGHT  LEFT    Lumbar Spine Exam  [x] All Neg    [x] All Neg     Straight leg raise  []  []Not tested   []  []Not tested    Clonus  []  []Not tested   []  []Not tested    Pain with motion  []  []Not tested   []  []Not tested    Radiculopathy  []  []Not tested   []  []Not tested    Paraspinal muscle tenderness  [] Paraspinal  []Midline   [] Paraspinal  []Midline   Sensation RIGHT  LEFT    L3  [x] Normal []Decreased    [x] Normal []Decreased   L4  [x] Normal  []Decreased   [x] Normal []Decreased   L5  [x] Normal []Decreased   [x] Normal []Decreased   S1  [x] Normal  []Decreased   [x] Normal []Decreased   Pelvis       Scoliosis  [x] Nml  [] Present     Leg-length discrepency  [x] Equal  [] Right longer   [] Left longer   Range of Motion Active Passive Active Passive   Hip Flexion 120  120 Abduction 50  50    External Rotation @ 90 flex 65  65    Internal Rotation @ 90 flex 20  20           Hip Impingement / Dysplasia  [x] All Neg  [] Not tested   [] All Neg  [] Not tested    Hip impingement test  []  []Not tested   [x]  []Not tested    C-sign  []  []Not tested   [x]  []Not tested    Anterior instability apprehension  []  []Not tested   []  []Not tested    Posterior instability apprehension  []  []Not tested   []  []Not tested    Uncontained Internal rotation  []  []Not tested  []  []Not tested          Abductors  [x] All Neg  [] Not tested   [x] All Neg  [] Not tested    Medius strength  []  []Not tested   []  []Not tested    Minimum strength  []  []Not tested   []  []Not tested    IT band tendonitis  []  []Not tested   []  []Not tested    Trochanteric tenderness  []  []Not tested  []  []Not tested   Sciatic neuropathic pain  []  []Not tested   []  []Not tested           Post-arthroplasty  [] All Neg  [] Not tested   [] All Neg  [] Not tested    Rectus tendonitis  []  []Not tested   []  []Not tested    Iliopsoas tendonitis       Start-up pain  []  []Not tested   []  []Not tested          Imaging    Left Hip: [facility]  Radiographs: X-rays were ordered today and reviewed of the left hip.  3 views. AP pelvis, lateral, and false profile. They demonstrate no evidence of fractures or dislocations. There is an indention due to hypertrophy of the iliocapsularis muscle. Patient does have a positive ischial spine sign for retroversion of the pelvis. Cam lesion present. Procedure:  Orders Placed This Encounter   Procedures    XR HIP LEFT (2-3 VIEWS)     Standing Status:   Future     Number of Occurrences:   1     Standing Expiration Date:   8/31/2023       Assessment and Plan  Nadya Bell was seen today for hip pain. Diagnoses and all orders for this visit:    Pain  -     XR HIP LEFT (2-3 VIEWS);  Future  [facility]    Impingement syndrome, hip, left  -     [facility]      Patient is suffering with hip impingement due to cam lesion and retroversion of her pelvis. We will start with physical therapy for controlled strengthening and Mobic. She will follow-up in the office in 6 weeks. If in 6 weeks not doing well consideration for MRI before considering cortisone injection. I discussed with Damion Pandey that her history, symptoms, signs, and imaging are most consistent with femoro-acetabular impingement    We reviewed the natural history of these conditions and treatment options ranging from conservative measures (rest, icing, activity modification, physical therapy, pain meds, cortisone injection) to surgical options. In terms of treatment, I recommended continuing with rest, icing, avoidance of painful activities, NSAIDs or pain meds as tolerated, and physical therapy. If these are not effective, cortisone injection can be considered. We discussed surgical options as well, should conservative measures fail. Electronically signed by Karen Ramírez MD on 9/1/2022 at 10:01 AM  This dictation was generated by voice recognition computer software. Although all attempts are made to edit the dictation for accuracy, there may be errors in the transcription that are not intended.

## 2022-09-13 ENCOUNTER — HOSPITAL ENCOUNTER (OUTPATIENT)
Dept: PHYSICAL THERAPY | Age: 37
Setting detail: THERAPIES SERIES
Discharge: HOME | End: 2022-09-13
Payer: COMMERCIAL

## 2022-09-13 PROCEDURE — 97112 NEUROMUSCULAR REEDUCATION: CPT | Performed by: PHYSICAL THERAPIST

## 2022-09-13 PROCEDURE — 97161 PT EVAL LOW COMPLEX 20 MIN: CPT | Performed by: PHYSICAL THERAPIST

## 2022-09-17 NOTE — PLAN OF CARE
The [facility] and [address],  Sports Performance and Rehabilitation, [facility] [address]  [address] [facility]   Rhonda Contreras  Phone: [phone]  Fax: [phone]                                                        Physical Therapy Certification    Dear dr Alexandru Ray  ,    We had the pleasure of evaluating the following patient for physical therapy services at [address] [place] [address]. A summary of our findings can be found in the initial assessment below. This includes our plan of care. If you have any questions or concerns regarding these findings, please do not hesitate to contact me at the office phone number checked above. Thank you for the referral.       Physician Signature:_______________________________Date:__________________  By signing above (or electronic signature), therapists plan is approved by physician      Patient: Km Pelayo   : 1985   MRN: [id number]  Referring Physician:        Evaluation Date: 2022     Medical Diagnosis Information:    Right hip pain - m25.551                                             Insurance information:       Precautions/ Contra-indications:   Latex Allergy:  [x]NO      []YES  Preferred Language for Healthcare:   [x]English       []Other:  C-SSRS Triggered by Intake questionnaire (Past 2 wk assessment):   [x] No, Questionnaire did not trigger screening.   [] Yes, Patient intake triggered further evaluation      [] C-SSRS Screening completed  [] PCP notified via Plan of Care  [] Emergency services notified      SUBJECTIVE: Patient stated complaint: incidiously progressive hip pain that was greatly exacerbated by a recent game of kick ball. MD detected some impingement at office visit.       Relevant Medical History:  Mild oa     Functional Disability Index:  67%    Pain Scale: 7/10    Easing factors: change of position     Provocative factors:  deep sitting, extended sitting, standing, rotation, running/kicking     Night Pain:    - complained of night pain associated with sleep position. Type:      - Intermittent      Paresthesia complaints:   - no paresthesia complaints       Functional Limitations/Impairments:   - Standing   - Walking      - Squatting/bending    - Stairs             - ADL's   - Sports/Recreation         Occupation/School:   -[employer]     Living Status/Prior Level of Function: This patient was independent in ADL's and IADL's prior to onset of symptoms. Sport/recreational activities:   - resistive training   - cardiovascular training         PACEMAKER:  - Denied having a pacemaker that would contraindicate the use of electrical modalities. METAL IMPLANTS:  - Denied metal implants that would contraindicate the use of thermal modalities. CANCER HISTORY:  - Denied a history of cancer that would contraindicate thermal modalities. OBJECTIVE:        Joint mobility:     Hypo    Palpation:     Functional Mobility/Transfers:     Posture:     Circumferential Measures:    ROM:  decreased active hip rom by 40%, passive by 30% global with contralateral testing     Strength/Neuromuscular control:  4-/5 right hip     Bandages/Dressings/Incisions:     Gait: moderate proprioceptive asymmetries     Dermatomal Sensation:  - Dermatomal sensation was intact to light touch bilaterally throughout upper and lower extremities. -  Deep tendon reflexes:  - DTR's were symmetrical and intact throughout. Orthopedic Special Tests:                [x] Patient history, allergies, meds reviewed. Medical chart reviewed. See intake form. Review Of Systems (ROS):  [x]Performed Review of systems (Integumentary, CardioPulmonary, Neurological) by intake and observation. Intake form has been scanned into medical record. Patient has been instructed to contact their primary care physician regarding ROS issues if not already being addressed at this time.       Objective Review of Systems:  Cognitive, Communication, Behavior and Learning:  - The patient was alert, spoke coherently and was oriented to person, place and time. - Demonstrated no barriers to communication or processing information.  - Appeared literate, spoke [place] and had no significant hearing or vision impairment. - Had no abnormal behavioral responses, learning preferences or educational needs. ASSESSMENT:    The patient demonstrated at least 66% but less than 68% impairment, limitation or restriction in:   - walking and moving around (mobility)   Functional Impairments:     [x]Noted lumbar/proximal hip/LE hypomobility   [x]Decreased LE functional ROM   [x]Decreased core/proximal hip strength and neuromuscular control   [x]Decreased LE functional strength   [x]Reduced balance/proprioceptive control   []other:      Functional Activity Limitations (from functional questionnaire and intake)   [x]Reduced ability to tolerate prolonged functional positions   [x]Reduced ability or difficulty with changes of positions or transfers between positions   [x]Reduced ability to maintain good posture and demonstrate good body mechanics with sitting, bending, and lifting   [x]Reduced ability to sleep   [x] Reduced ability or tolerance with driving and/or computer work   [x]Reduced ability to perform lifting, carrying tasks   [x]Reduced ability to squat   [x]Reduced ability to forward bend   [x]Reduced ability to ambulate prolonged functional periods/distances/surfaces   [x]Reduced ability to ascend/descend stairs   [x]Reduced ability to run, hop or jump    Participation Restrictions   [x]Reduced participation in self care activities   [x]Reduced participation in home management activities   [x]Reduced participation in work activities   [x]Reduced participation in social activities. [x]Reduced participation in sport activities.     Classification :    []Signs/symptoms consistent with post-surgical status including decreased ROM, strength and function. []Signs/symptoms consistent with joint sprain/strain   []Signs/symptoms consistent with patella-femoral syndrome   []Signs/symptoms consistent with knee OA/hip OA   []Signs/symptoms consistent with internal derangement of knee/Hip   []Signs/symptoms consistent with functional hip weakness/NMR control      [x]Signs/symptoms consistent with tendinitis/tendinosis    []signs/symptoms consistent with pathology which may benefit from Dry needling      []other:    Classification :   - ligament or other connective tissue dysfunction. (Pattern 4D)  - localized inflammation. (Pattern 4E)        Prognosis/Rehab Potential:       - Good        Factors affecting rehab potential:  - associated co-morbidities    Tolerance of evaluation/treatment:     - Good       Physical Therapy Evaluation Complexity Justification  [] A history of present problem with:  [] no personal factors and/or comorbidities that impact the plan of care;  []1-2 personal factors and/or comorbidities that impact the plan of care  []3 personal factors and/or comorbidities that impact the plan of care  [x] An examination of body systems using standardized tests and measures addressing any of the following: body structures and functions (impairments), activity limitations, and/or participation restrictions;:  [x] a total of 1-2 or more elements   [] a total of 3 or more elements   [] a total of 4 or more elements   [x] A clinical presentation with:  [] stable and/or uncomplicated characteristics   [x] evolving clinical presentation with changing characteristics  [] unstable and unpredictable characteristics;   [x] Clinical decision making of [x] low, [] moderate, [] high complexity using standardized patient assessment instrument and/or measurable assessment of functional outcome.     [x] EVAL (LOW) 81709 (typically 30 minutes face-to-face)  [] EVAL (MOD) 69938 (typically 30 minutes face-to-face)  [] EVAL (HIGH) 92161 (typically 45 minutes face-to-face)  [] RE-EVAL         Co-morbidities/Complexities (which will affect course of rehabilitation):   []None           Arthritic conditions   []Rheumatoid arthritis (M05.9)  []Osteoarthritis (M19.91)   Cardiovascular conditions   []Hypertension (I10)  []Hyperlipidemia (E78.5)  []Angina pectoris (I20)  []Atherosclerosis (I70)   Musculoskeletal conditions   []Disc pathology   []Congenital spine pathologies   []Prior surgical intervention  []Osteoporosis (M81.8)  []Osteopenia (M85.8)   Endocrine conditions   []Hypothyroid (E03.9)  []Hyperthyroid Gastrointestinal conditions   []Constipation (T72.64)   Metabolic conditions   []Morbid obesity (E66.01)  []Diabetes type 1(E10.65) or 2 (E11.65)   []Neuropathy (G60.9)     Pulmonary conditions   []Asthma (J45)  []Coughing   []COPD (J44.9)   Psychological Disorders  []Anxiety (F41.9)  []Depression (F32.9)   []Other:   []Other:          PLAN  Frequency/Duration:  2 days per week for 8 Weeks:  Interventions:  - Therapeutic exercise including: strength training, ROM/flexibility, NMR and proprioception for the proximal hip, trunk and Lower extremity  - Manual therapy as indicated including Dry Needling/IASTM, STM, PROM, Gr I-IV mobilizations, spinal mobilization/manipulation.   - Modalities as needed including: thermal agents, E-stim, US, iontophoresis as indicated. - Patient education on joint protection, activity modification, progression of HEP. HEP instruction:     GOALS:  Patient stated goal:     Therapist goals for Patient:   Short Term Goals: To be achieved in: 2 weeks  - Independent in HEP and progression per patient tolerance, in order to prevent re-injury. - Patient will have a decrease in pain to facilitate improvement in movement, function, and ADLs as indicated by Functional Deficits. Long Term Goals:  To be achieved in: 12 weeks  - The patient is expected to demonstrate less than % impairment, limitation or restriction in:  - walking and moving around (mobility)  - Patient will demonstrate increased passive and active ROM to full, symmetrical and painfree to allow for proper joint functioning as indicated by patients Functional Deficits. - Patient will demonstrate an increase in Strength to full and painfree to resistance testing to allow for proper functional mobility as indicated by patients Functional Deficits.      - Patient will return to desired, higher level,  functional activities without increased symptoms or restriction.      - Patient will demonstrate negligible proprioceptive asymmetries     Electronically signed by:  Jenni Ohara, PT , MPT

## 2022-09-17 NOTE — FLOWSHEET NOTE
[facility] and [address],  Sports Performance and Rehabilitation, [facility] [address] [address]  793 [facility],5Th Floor   Rhonda Contreras  Phone: [phone]  Fax: [phone]       Physical Therapy Treatment Note/ Progress Report:           Date:  2022    Patient Name:  Baylee Dias    :  1985  MRN: [id number]  Restrictions/Precautions:    Medical/Treatment Diagnosis Information:   Right hip pain - K30.326     Insurance/Certification information:     Physician Information:     Has the plan of care been signed (Y/N):        []  Yes  [x]  No     Date of Patient follow up with Physician:       Is this a Progress Report:     []  Yes  [x]  No        If Yes:  Date Range for reporting period:  Beginning  Ending    Progress report will be due (10 Rx or 30 days whichever is less):       Recertification will be due (POC Duration  / 90 days whichever is less):          Visit # Insurance Allowable Auth Required   1  []  Yes []  No        Functional Scale:     Date assessed:        Latex Allergy:  [x]NO      []YES  Preferred Language for Healthcare:   [x]English       []other:      Pain level:  7/10     SUBJECTIVE:  See eval    OBJECTIVE: See eval  Observation:   Test measurements:      RESTRICTIONS/PRECAUTIONS:     Exercises/Interventions:       Therapeutic Ex (83310) Sets/sec Reps Notes/CUES   Child pose   5 x 10\"    Standing eob 3d hip flexor stretch matrix  10x each way    Supine belt hip flexor stretch   3 x 20\"                                        Education   8'                 Manual Intervention (85884)      Prom/stm   18'                                                                                                                                             Therapeutic Exercise and NMR EXR  [x] (11790) Provided verbal/tactile cueing for activities related to strengthening, flexibility, endurance, ROM for improvements in LE, proximal hip, and core control with self care, mobility, lifting, ambulation. [x] (45200) Provided verbal/tactile cueing for activities related to improving balance, coordination, kinesthetic sense, posture, motor skill, proprioception to assist with LE, proximal hip, and core control in self-care, mobility, lifting, ambulation and eccentric single leg control. NMR and Therapeutic Activities:    [x] (32500 or 16322) Provided verbal/tactile cueing for activities related to improving balance, coordination, kinesthetic sense, posture, motor skill, proprioception and motor activation to allow for proper function of core, proximal hip and LE with self-care and ADLs and functional mobility.   [] (71427) Gait Re-education- Provided training and instruction to the patient for proper LE, core and proximal hip recruitment and positioning and eccentric body weight control with ambulation re-education including up and down stairs     Home Exercise Program:    [x] (82871) Reviewed/Progressed HEP activities related to strengthening, flexibility, endurance, ROM of core, proximal hip and LE for functional self-care, mobility, lifting and ambulation/stair navigation   [] (37398) Reviewed/Progressed HEP activities related to improving balance, coordination, kinesthetic sense, posture, motor skill, proprioception of core, proximal hip and LE for self-care, mobility, lifting, and ambulation/stair navigation      Manual Treatments:  PROM / STM / Oscillations-Mobs:  G-I, II, III, IV (PA's, Inf., Post.)  [x] (62807) Provided manual therapy to mobilize LE, proximal hip and/or LS spine soft tissue/joints for the purpose of modulating pain, promoting relaxation, increasing ROM, reducing/eliminating soft tissue swelling/inflammation/restriction, improving soft tissue extensibility and allowing for proper ROM for normal function with self-care, mobility, lifting and ambulation. Modalities:     [] GAME READY (VASO)- for significant edema, swelling, pain control. Charges:  Timed Code Treatment Minutes: 35'   Total Treatment Minutes: 61'       [x] EVAL (LOW) 24241 (typically 20 minutes face-to-face)  [] EVAL (MOD) 74570 (typically 30 minutes face-to-face)  [] EVAL (HIGH) 81012 (typically 45 minutes face-to-face)  [] RE-EVAL     [] BI(74082) x     [] IONTO  [x] NMR (04857) x    1 [] VASO  [] Manual (20257) x     [] Other:  [] TA x      [] Mech Traction (57261)  [] ES(attended) (10650)      [] ES (un) (29107):         ASSESSMENT:  See eval      GOALS:   Patient stated goal:     [] Progressing: [] Met: [] Not Met: [] Adjusted    Therapist goals for Patient:   Short Term Goals: To be achieved in: 2 weeks  1. Independent in HEP and progression per patient tolerance, in order to prevent re-injury. [] Progressing: [] Met: [] Not Met: [] Adjusted   2. Patient will have a decrease in pain to facilitate improvement in movement, function, and ADLs as indicated by Functional Deficits. [] Progressing: [] Met: [] Not Met: [] Adjusted    Long Term Goals: To be achieved in:   12 weeks  - The patient is expected to demonstrate less than % impairment, limitation or restriction in:  - walking and moving around (mobility)  - Patient will demonstrate increased passive and active ROM to full, symmetrical and painfree to allow for proper joint functioning as indicated by patients Functional Deficits. [] Progressing: [] Met: [] Not Met: [] Adjusted  - Patient will demonstrate an increase in Strength to full and painfree to resistance testing to allow for proper functional mobility as indicated by patients Functional Deficits. [] Progressing: [] Met: [] Not Met: [] Adjusted  - Patient will return to desired, higher level,  functional activities without increased symptoms or restriction.            [] Progressing: [] Met: [] Not Met: [] Adjusted  - Patient will demonstrate negligible proprioceptive asymmetries   [] Progressing: [] Met: [] Not Met: [] Adjusted            Overall Progression Towards Functional goals/ Treatment Progress Update:  [] Patient is progressing as expected towards functional goals listed. [] Progression is slowed due to complexities/Impairments listed. [] Progression has been slowed due to co-morbidities. [x] Plan just implemented, too soon to assess goals progression <30days   [] Goals require adjustment due to lack of progress  [] Patient is not progressing as expected and requires additional follow up with physician  [] Other    Prognosis for POC: [x] Good [] Fair  [] Poor      Patient requires continued skilled intervention: [x] Yes  [] No    Treatment/Activity Tolerance:  [x] Patient able to complete treatment  [] Patient limited by fatigue  [] Patient limited by pain    [] Patient limited by other medical complications  [] Other:         Return to Play: (if applicable)   []  Stage 1: Intro to Strength   []  Stage 2: Return to Run and Strength   []  Stage 3: Return to Jump and Strength   []  Stage 4: Dynamic Strength and Agility   []  Stage 5: Sport Specific Training     []  Ready to Return to Play, Meets All Above Stages   []  Not Ready for Return to Sports   Comments:                               PLAN: See eval  [] Continue per plan of care [] Alter current plan (see comments above)  [x] Plan of care initiated [] Hold pending MD visit [] Discharge      Electronically signed by:  Sterling Goodwin, PT, MPT     Note: If patient does not return for scheduled/ recommended follow up visits, this note will serve as a discharge from care along with most recent update on progress.

## 2022-09-20 ENCOUNTER — HOSPITAL ENCOUNTER (OUTPATIENT)
Dept: PHYSICAL THERAPY | Age: 37
Setting detail: THERAPIES SERIES
Discharge: HOME | End: 2022-09-20
Payer: COMMERCIAL

## 2022-09-20 PROCEDURE — 97110 THERAPEUTIC EXERCISES: CPT | Performed by: PHYSICAL THERAPIST

## 2022-09-20 PROCEDURE — 97140 MANUAL THERAPY 1/> REGIONS: CPT | Performed by: PHYSICAL THERAPIST

## 2022-09-20 PROCEDURE — 97112 NEUROMUSCULAR REEDUCATION: CPT | Performed by: PHYSICAL THERAPIST

## 2022-09-24 NOTE — FLOWSHEET NOTE
[facility] and [address],  Sports Performance and Rehabilitation, [facility] [address]99 [address]  793 [facility],5[address] Floor   Rhonda Contreras  Phone: [phone]  Fax: [phone]       Physical Therapy Treatment Note/ Progress Report:           Date:  2022    Patient Name:  Saritha Gilliam    :  1985  MRN: [id number]  Restrictions/Precautions:    Medical/Treatment Diagnosis Information:   Right hip pain - M92.028     Insurance/Certification information:     Physician Information:     Has the plan of care been signed (Y/N):        []  Yes  [x]  No     Date of Patient follow up with Physician:       Is this a Progress Report:     []  Yes  [x]  No        If Yes:  Date Range for reporting period:  Beginning  Ending    Progress report will be due (10 Rx or 30 days whichever is less):       Recertification will be due (POC Duration  / 90 days whichever is less):          Visit # Insurance Allowable Auth Required   2  []  Yes []  No        Functional Scale:     Date assessed:        Latex Allergy:  [x]NO      []YES  Preferred Language for Healthcare:   [x]English       []other:      Pain level:  nr    SUBJECTIVE:  \"a bit sore\"    OBJECTIVE: moderate anterior hip tenderness   Observation:   Test measurements:      RESTRICTIONS/PRECAUTIONS:     Exercises/Interventions:       Therapeutic Ex (16056) Sets/sec Reps Notes/CUES   Child pose   5 x 10\"    Standing eob 3d hip flexor stretch matrix  10x each way    Supine belt hip flexor stretch   3 x 20\"    Reformer:   10'    Kneel on bosu: ball tosses (3 way), tbands (3 ways)  12'    Cone reaches   10x each     Bird dogs (with foam roll)  10x each     Single leg bridge   20x    Mip on foam roll   20x each    Bike   8' (hi)                Manual Intervention (26342)      Prom/stm   18'                                                                                                                                             Therapeutic Exercise and NMR EXR  [x] (88445) Provided verbal/tactile cueing for activities related to strengthening, flexibility, endurance, ROM for improvements in LE, proximal hip, and core control with self care, mobility, lifting, ambulation. [x] (77376) Provided verbal/tactile cueing for activities related to improving balance, coordination, kinesthetic sense, posture, motor skill, proprioception to assist with LE, proximal hip, and core control in self-care, mobility, lifting, ambulation and eccentric single leg control.      NMR and Therapeutic Activities:    [x] (87948 or 62161) Provided verbal/tactile cueing for activities related to improving balance, coordination, kinesthetic sense, posture, motor skill, proprioception and motor activation to allow for proper function of core, proximal hip and LE with self-care and ADLs and functional mobility.   [] (97056) Gait Re-education- Provided training and instruction to the patient for proper LE, core and proximal hip recruitment and positioning and eccentric body weight control with ambulation re-education including up and down stairs     Home Exercise Program:    [x] (10684) Reviewed/Progressed HEP activities related to strengthening, flexibility, endurance, ROM of core, proximal hip and LE for functional self-care, mobility, lifting and ambulation/stair navigation   [] (28236) Reviewed/Progressed HEP activities related to improving balance, coordination, kinesthetic sense, posture, motor skill, proprioception of core, proximal hip and LE for self-care, mobility, lifting, and ambulation/stair navigation      Manual Treatments:  PROM / STM / Oscillations-Mobs:  G-I, II, III, IV (PA's, Inf., Post.)  [x] (72315) Provided manual therapy to mobilize LE, proximal hip and/or LS spine soft tissue/joints for the purpose of modulating pain, promoting relaxation, increasing ROM, reducing/eliminating soft tissue swelling/inflammation/restriction, improving soft tissue extensibility and allowing for proper ROM for normal function with self-care, mobility, lifting and ambulation. Modalities:     [] GAME READY (VASO)- for significant edema, swelling, pain control. Charges:  Timed Code Treatment Minutes: 35'   Total Treatment Minutes: 28'      [] EVAL (LOW) 69292 (typically 20 minutes face-to-face)  [] EVAL (MOD) 70783 (typically 30 minutes face-to-face)  [] EVAL (HIGH) 67884 (typically 45 minutes face-to-face)  [] RE-EVAL     [] IA(68679) x     [] IONTO  [x] NMR (83905) x    1 [] VASO  [] Manual (28744) x     [] Other:  [] TA x      [] Mech Traction (61706)  [] ES(attended) (76474)      [] ES (un) (40424):         ASSESSMENT:  See eval      GOALS:   Patient stated goal:     [x] Progressing: [] Met: [] Not Met: [] Adjusted    Therapist goals for Patient:   Short Term Goals: To be achieved in: 2 weeks  1. Independent in HEP and progression per patient tolerance, in order to prevent re-injury. [x] Progressing: [] Met: [] Not Met: [] Adjusted   2. Patient will have a decrease in pain to facilitate improvement in movement, function, and ADLs as indicated by Functional Deficits. [x] Progressing: [] Met: [] Not Met: [] Adjusted    Long Term Goals: To be achieved in:   12 weeks  - The patient is expected to demonstrate less than % impairment, limitation or restriction in:  - walking and moving around (mobility)  - Patient will demonstrate increased passive and active ROM to full, symmetrical and painfree to allow for proper joint functioning as indicated by patients Functional Deficits. [x] Progressing: [] Met: [] Not Met: [] Adjusted  - Patient will demonstrate an increase in Strength to full and painfree to resistance testing to allow for proper functional mobility as indicated by patients Functional Deficits. [x] Progressing: [] Met: [] Not Met: [] Adjusted  - Patient will return to desired, higher level,  functional activities without increased symptoms or restriction. [x] Progressing: [] Met: [] Not Met: [] Adjusted  - Patient will demonstrate negligible proprioceptive asymmetries   [x] Progressing: [] Met: [] Not Met: [] Adjusted            Overall Progression Towards Functional goals/ Treatment Progress Update:  [] Patient is progressing as expected towards functional goals listed. [] Progression is slowed due to complexities/Impairments listed. [] Progression has been slowed due to co-morbidities. [x] Plan just implemented, too soon to assess goals progression <30days   [] Goals require adjustment due to lack of progress  [] Patient is not progressing as expected and requires additional follow up with physician  [] Other    Prognosis for POC: [x] Good [] Fair  [] Poor      Patient requires continued skilled intervention: [x] Yes  [] No    Treatment/Activity Tolerance:  [x] Patient able to complete treatment  [] Patient limited by fatigue  [] Patient limited by pain    [] Patient limited by other medical complications  [] Other:         Return to Play: (if applicable)   []  Stage 1: Intro to Strength   []  Stage 2: Return to Run and Strength   []  Stage 3: Return to Jump and Strength   []  Stage 4: Dynamic Strength and Agility   []  Stage 5: Sport Specific Training     []  Ready to Return to Play, Meets All Above Stages   []  Not Ready for Return to Sports   Comments:                               PLAN: See eval  [x] Continue per plan of care [] Alter current plan (see comments above)  [] Plan of care initiated [] Hold pending MD visit [] Discharge      Electronically signed by:  Joao Edwards PT, MPT     Note: If patient does not return for scheduled/ recommended follow up visits, this note will serve as a discharge from care along with most recent update on progress.

## 2022-09-27 ENCOUNTER — HOSPITAL ENCOUNTER (OUTPATIENT)
Dept: PHYSICAL THERAPY | Age: 37
Setting detail: THERAPIES SERIES
Discharge: HOME | End: 2022-09-27
Payer: COMMERCIAL

## 2022-09-27 PROCEDURE — 97112 NEUROMUSCULAR REEDUCATION: CPT | Performed by: PHYSICAL THERAPIST

## 2022-09-29 NOTE — FLOWSHEET NOTE
[facility] and [address],  Sports Performance and Rehabilitation, [facility] [address] [place] [address]  793 [facility],5Th Floor   Rhonda Contreras  Phone: [phone]  Fax: [phone]       Physical Therapy Treatment Note/ Progress Report:           Date:  2022    Patient Name:  Chel Bermudez    :  1985  MRN: [id number]  Restrictions/Precautions:    Medical/Treatment Diagnosis Information:   Right hip pain - F64.678     Insurance/Certification information:     Physician Information:     Has the plan of care been signed (Y/N):        []  Yes  [x]  No     Date of Patient follow up with Physician:       Is this a Progress Report:     []  Yes  [x]  No        If Yes:  Date Range for reporting period:  Beginning  Ending    Progress report will be due (10 Rx or 30 days whichever is less):       Recertification will be due (POC Duration  / 90 days whichever is less):          Visit # Insurance Allowable Auth Required   3  []  Yes []  No        Functional Scale:     Date assessed:        Latex Allergy:  [x]NO      []YES  Preferred Language for Healthcare:   [x]English       []other:      Pain level:  nr    SUBJECTIVE:  \"doing the home stuff\"    OBJECTIVE: moderate anterior hip tenderness   Observation:   Test measurements:      RESTRICTIONS/PRECAUTIONS:     Exercises/Interventions:       Therapeutic Ex (97282) Sets/sec Reps Notes/CUES   Child pose   5 x 10\"    Standing eob 3d hip flexor stretch matrix  10x each way    Supine belt hip flexor stretch   3 x 20\"    Reformer:   10'    Kneel on bosu: ball tosses (3 way), tbands (3 ways)  12'    Cone reaches   10x each     Bird dogs (with foam roll)  10x each     Single leg bridge   20x    Mip on foam roll   20x each    Bike   8' (hi)                Manual Intervention (57126)      Prom/stm   18' tissue extensibility and allowing for proper ROM for normal function with self-care, mobility, lifting and ambulation. Modalities:     [] GAME READY (VASO)- for significant edema, swelling, pain control. Charges:  Timed Code Treatment Minutes: 35'   Total Treatment Minutes: 28'      [] EVAL (LOW) 50819 (typically 20 minutes face-to-face)  [] EVAL (MOD) 48305 (typically 30 minutes face-to-face)  [] EVAL (HIGH) 55883 (typically 45 minutes face-to-face)  [] RE-EVAL     [] TP(26610) x     [] IONTO  [x] NMR (24269) x    1 [] VASO  [] Manual (11587) x     [] Other:  [] TA x      [] Mech Traction (04037)  [] ES(attended) (89288)      [] ES (un) (40077):         ASSESSMENT:  See eval      GOALS:   Patient stated goal:     [x] Progressing: [] Met: [] Not Met: [] Adjusted    Therapist goals for Patient:   Short Term Goals: To be achieved in: 2 weeks  1. Independent in HEP and progression per patient tolerance, in order to prevent re-injury. [x] Progressing: [] Met: [] Not Met: [] Adjusted   2. Patient will have a decrease in pain to facilitate improvement in movement, function, and ADLs as indicated by Functional Deficits. [x] Progressing: [] Met: [] Not Met: [] Adjusted    Long Term Goals: To be achieved in:   12 weeks  - The patient is expected to demonstrate less than % impairment, limitation or restriction in:  - walking and moving around (mobility)  - Patient will demonstrate increased passive and active ROM to full, symmetrical and painfree to allow for proper joint functioning as indicated by patients Functional Deficits. [x] Progressing: [] Met: [] Not Met: [] Adjusted  - Patient will demonstrate an increase in Strength to full and painfree to resistance testing to allow for proper functional mobility as indicated by patients Functional Deficits.     [x] Progressing: [] Met: [] Not Met: [] Adjusted  - Patient will return to desired, higher level,  functional activities without increased symptoms or restriction. [x] Progressing: [] Met: [] Not Met: [] Adjusted  - Patient will demonstrate negligible proprioceptive asymmetries   [x] Progressing: [] Met: [] Not Met: [] Adjusted            Overall Progression Towards Functional goals/ Treatment Progress Update:  [] Patient is progressing as expected towards functional goals listed. [] Progression is slowed due to complexities/Impairments listed. [] Progression has been slowed due to co-morbidities. [x] Plan just implemented, too soon to assess goals progression <30days   [] Goals require adjustment due to lack of progress  [] Patient is not progressing as expected and requires additional follow up with physician  [] Other    Prognosis for POC: [x] Good [] Fair  [] Poor      Patient requires continued skilled intervention: [x] Yes  [] No    Treatment/Activity Tolerance:  [x] Patient able to complete treatment  [] Patient limited by fatigue  [] Patient limited by pain    [] Patient limited by other medical complications  [] Other:         Return to Play: (if applicable)   []  Stage 1: Intro to Strength   []  Stage 2: Return to Run and Strength   []  Stage 3: Return to Jump and Strength   []  Stage 4: Dynamic Strength and Agility   []  Stage 5: Sport Specific Training     []  Ready to Return to Play, Meets All Above Stages   []  Not Ready for Return to Sports   Comments:                               PLAN: See eval  [x] Continue per plan of care [] Alter current plan (see comments above)  [] Plan of care initiated [] Hold pending MD visit [] Discharge      Electronically signed by:  Red Hollingsworth PT, MPT     Note: If patient does not return for scheduled/ recommended follow up visits, this note will serve as a discharge from care along with most recent update on progress.

## 2022-10-05 ENCOUNTER — HOSPITAL ENCOUNTER (OUTPATIENT)
Dept: PHYSICAL THERAPY | Age: 37
Setting detail: THERAPIES SERIES
Discharge: HOME | End: 2022-10-05
Payer: COMMERCIAL

## 2022-10-05 PROCEDURE — 97112 NEUROMUSCULAR REEDUCATION: CPT | Performed by: PHYSICAL THERAPIST

## 2022-10-09 NOTE — FLOWSHEET NOTE
[facility] and [address],  [facility], Formerly [facility] [address]199 12[address]  [address] [facility],5Th Floor   [facility], [address]  Phone: [phone]  Fax: [phone]       Physical Therapy Treatment Note/ Progress Report:           Date:  10/05/2022    Patient Name:  Irlanda Cardenas    :  1985  MRN: [id number]  Restrictions/Precautions:    Medical/Treatment Diagnosis Information:   Right hip pain - T25.800     Insurance/Certification information:     Physician Information:     Has the plan of care been signed (Y/N):        []  Yes  [x]  No     Date of Patient follow up with Physician:       Is this a Progress Report:     []  Yes  [x]  No        If Yes:  Date Range for reporting period:  Beginning  Ending    Progress report will be due (10 Rx or 30 days whichever is less):       Recertification will be due (POC Duration  / 90 days whichever is less):          Visit # Insurance Allowable Auth Required   3  []  Yes []  No        Functional Scale:     Date assessed:        Latex Allergy:  [x]NO      []YES  Preferred Language for Healthcare:   [x]English       []other:      Pain level:  nr    SUBJECTIVE:  \"a bit stronger. Pink William Pink William \"    OBJECTIVE: 4/5 hip strength   Observation:   Test measurements:      RESTRICTIONS/PRECAUTIONS:     Exercises/Interventions:       Therapeutic Ex (56666) Sets/sec Reps Notes/CUES   Child pose   5 x 10\"    Standing eob 3d hip flexor stretch matrix  10x each way    Supine belt hip flexor stretch   3 x 20\"    Reformer:   10'    Kneel on bosu: ball tosses (3 way), tbands (3 ways)  12'    Cone reaches   10x each     Bird dogs (with foam roll)  10x each     Single leg bridge   20x    Mip on foam roll   20x each    Bike   8' (hi)                Manual Intervention (73133)      Prom/stm   18'                                                                                                                                             Therapeutic Exercise and NMR EXR  [x] (92255) Provided verbal/tactile cueing for activities related to strengthening, flexibility, endurance, ROM for improvements in LE, proximal hip, and core control with self care, mobility, lifting, ambulation. [x] (69416) Provided verbal/tactile cueing for activities related to improving balance, coordination, kinesthetic sense, posture, motor skill, proprioception to assist with LE, proximal hip, and core control in self-care, mobility, lifting, ambulation and eccentric single leg control.      NMR and Therapeutic Activities:    [x] (53518 or 68897) Provided verbal/tactile cueing for activities related to improving balance, coordination, kinesthetic sense, posture, motor skill, proprioception and motor activation to allow for proper function of core, proximal hip and LE with self-care and ADLs and functional mobility.   [] (48440) Gait Re-education- Provided training and instruction to the patient for proper LE, core and proximal hip recruitment and positioning and eccentric body weight control with ambulation re-education including up and down stairs     Home Exercise Program:    [x] (67099) Reviewed/Progressed HEP activities related to strengthening, flexibility, endurance, ROM of core, proximal hip and LE for functional self-care, mobility, lifting and ambulation/stair navigation   [] (93625) Reviewed/Progressed HEP activities related to improving balance, coordination, kinesthetic sense, posture, motor skill, proprioception of core, proximal hip and LE for self-care, mobility, lifting, and ambulation/stair navigation      Manual Treatments:  PROM / STM / Oscillations-Mobs:  G-I, II, III, IV (PA's, Inf., Post.)  [x] (27289) Provided manual therapy to mobilize LE, proximal hip and/or LS spine soft tissue/joints for the purpose of modulating pain, promoting relaxation, increasing ROM, reducing/eliminating soft tissue swelling/inflammation/restriction, improving soft tissue extensibility and allowing for proper ROM for normal function with self-care, mobility, lifting and ambulation. Modalities:     [] GAME READY (VASO)- for significant edema, swelling, pain control. Charges:  Timed Code Treatment Minutes: 35'   Total Treatment Minutes: 28'      [] EVAL (LOW) 29186 (typically 20 minutes face-to-face)  [] EVAL (MOD) 21424 (typically 30 minutes face-to-face)  [] EVAL (HIGH) 29774 (typically 45 minutes face-to-face)  [] RE-EVAL     [] RJ(20279) x     [] IONTO  [x] NMR (22423) x    1 [] VASO  [] Manual (08082)   [] Other:  [] TA x      [] Mech Traction (90365)  [] ES(attended) (66719)      [] ES (un) (79381):         ASSESSMENT:  See eval      GOALS:   Patient stated goal:     [x] Progressing: [] Met: [] Not Met: [] Adjusted    Therapist goals for Patient:   Short Term Goals: To be achieved in: 2 weeks  1. Independent in HEP and progression per patient tolerance, in order to prevent re-injury. [x] Progressing: [] Met: [] Not Met: [] Adjusted   2. Patient will have a decrease in pain to facilitate improvement in movement, function, and ADLs as indicated by Functional Deficits. [x] Progressing: [] Met: [] Not Met: [] Adjusted    Long Term Goals: To be achieved in:   12 weeks  - The patient is expected to demonstrate less than % impairment, limitation or restriction in:  - walking and moving around (mobility)  - Patient will demonstrate increased passive and active ROM to full, symmetrical and painfree to allow for proper joint functioning as indicated by patients Functional Deficits. [x] Progressing: [] Met: [] Not Met: [] Adjusted  - Patient will demonstrate an increase in Strength to full and painfree to resistance testing to allow for proper functional mobility as indicated by patients Functional Deficits. [x] Progressing: [] Met: [] Not Met: [] Adjusted  - Patient will return to desired, higher level,  functional activities without increased symptoms or restriction.            [x] Progressing: [] Met: [] Not Met: [] Adjusted  - Patient will demonstrate negligible proprioceptive asymmetries   [x] Progressing: [] Met: [] Not Met: [] Adjusted            Overall Progression Towards Functional goals/ Treatment Progress Update:  [] Patient is progressing as expected towards functional goals listed. [] Progression is slowed due to complexities/Impairments listed. [] Progression has been slowed due to co-morbidities. [x] Plan just implemented, too soon to assess goals progression <30days   [] Goals require adjustment due to lack of progress  [] Patient is not progressing as expected and requires additional follow up with physician  [] Other    Prognosis for POC: [x] Good [] Fair  [] Poor      Patient requires continued skilled intervention: [x] Yes  [] No    Treatment/Activity Tolerance:  [x] Patient able to complete treatment  [] Patient limited by fatigue  [] Patient limited by pain    [] Patient limited by other medical complications  [] Other:         Return to Play: (if applicable)   []  Stage 1: Intro to Strength   []  Stage 2: Return to Run and Strength   []  Stage 3: Return to Jump and Strength   []  Stage 4: Dynamic Strength and Agility   []  Stage 5: Sport Specific Training     []  Ready to Return to Play, Meets All Above Stages   []  Not Ready for Return to Sports   Comments:                               PLAN: See eval  [x] Continue per plan of care [] Alter current plan (see comments above)  [] Plan of care initiated [] Hold pending MD visit [] Discharge      Electronically signed by:  Melvin Hua PT, MPT     Note: If patient does not return for scheduled/ recommended follow up visits, this note will serve as a discharge from care along with most recent update on progress.

## 2022-10-12 ENCOUNTER — HOSPITAL ENCOUNTER (OUTPATIENT)
Dept: PHYSICAL THERAPY | Age: 37
Setting detail: THERAPIES SERIES
Discharge: HOME | End: 2022-10-12
Payer: COMMERCIAL

## 2022-10-12 PROCEDURE — 97112 NEUROMUSCULAR REEDUCATION: CPT | Performed by: PHYSICAL THERAPIST

## 2022-10-14 NOTE — FLOWSHEET NOTE
[facility] and [address],  Sports Performance and Rehabilitation, [facility] [address]199 [address]  793 [facility],5Th Floor   Rhonda Contreras  Phone: [phone]  Fax: [phone]       Physical Therapy Treatment Note/ Progress Report:           Date:  10/12/2022    Patient Name:  Vishal Hagan    :  1985  MRN: [id number]  Restrictions/Precautions:    Medical/Treatment Diagnosis Information:   Right hip pain - C91.480     Insurance/Certification information:     Physician Information:     Has the plan of care been signed (Y/N):        []  Yes  [x]  No     Date of Patient follow up with Physician:       Is this a Progress Report:     []  Yes  [x]  No        If Yes:  Date Range for reporting period:  Beginning  Ending    Progress report will be due (10 Rx or 30 days whichever is less):       Recertification will be due (POC Duration  / 90 days whichever is less):          Visit # Insurance Allowable Auth Required   4  []  Yes []  No        Functional Scale:     Date assessed:        Latex Allergy:  [x]NO      []YES  Preferred Language for Healthcare:   [x]English       []other:      Pain level:  nr    SUBJECTIVE:  \"I am feeling about the same\"    OBJECTIVE: 4/5 hip strength   Observation:   Test measurements:      RESTRICTIONS/PRECAUTIONS:     Exercises/Interventions:   [id number]    Therapeutic Ex (75928) Sets/sec Reps Notes/CUES   Child pose   5 x 10\"    Standing eob 3d hip flexor stretch matrix  10x each way    Supine belt hip flexor stretch   3 x 20\"    Reformer:   10'    Kneel on bosu: ball tosses (3 way), tbands (3 ways)  12'    Cone reaches   10x each     Bird dogs (with foam roll)  10x each     Single leg bridge   20x    Mip on foam roll   20x each    Bike   8' (hi)                Manual Intervention (68675)      Prom/stm   18' Therapeutic Exercise and NMR EXR  [x] (81835) Provided verbal/tactile cueing for activities related to strengthening, flexibility, endurance, ROM for improvements in LE, proximal hip, and core control with self care, mobility, lifting, ambulation. [x] (87759) Provided verbal/tactile cueing for activities related to improving balance, coordination, kinesthetic sense, posture, motor skill, proprioception to assist with LE, proximal hip, and core control in self-care, mobility, lifting, ambulation and eccentric single leg control.      NMR and Therapeutic Activities:    [x] (28724 or 14603) Provided verbal/tactile cueing for activities related to improving balance, coordination, kinesthetic sense, posture, motor skill, proprioception and motor activation to allow for proper function of core, proximal hip and LE with self-care and ADLs and functional mobility.   [] (72641) Gait Re-education- Provided training and instruction to the patient for proper LE, core and proximal hip recruitment and positioning and eccentric body weight control with ambulation re-education including up and down stairs     Home Exercise Program:    [x] (95373) Reviewed/Progressed HEP activities related to strengthening, flexibility, endurance, ROM of core, proximal hip and LE for functional self-care, mobility, lifting and ambulation/stair navigation   [] (97536) Reviewed/Progressed HEP activities related to improving balance, coordination, kinesthetic sense, posture, motor skill, proprioception of core, proximal hip and LE for self-care, mobility, lifting, and ambulation/stair navigation      Manual Treatments:  PROM / STM / Oscillations-Mobs:  G-I, II, III, IV (PA's, Inf., Post.)  [x] (57332) Provided manual therapy to mobilize LE, proximal hip and/or LS spine soft tissue/joints for the purpose of modulating pain, promoting relaxation, increasing ROM, reducing/eliminating soft tissue swelling/inflammation/restriction, improving soft tissue extensibility and allowing for proper ROM for normal function with self-care, mobility, lifting and ambulation. Modalities:     [] GAME READY (VASO)- for significant edema, swelling, pain control. Charges:  Timed Code Treatment Minutes: 35'   Total Treatment Minutes: 28'      [] EVAL (LOW) 67855 (typically 20 minutes face-to-face)  [] EVAL (MOD) 02383 (typically 30 minutes face-to-face)  [] EVAL (HIGH) 60858 (typically 45 minutes face-to-face)  [] RE-EVAL     [] BX(96910) x     [] IONTO  [x] NMR (09451) x    1 [] VASO  [] Manual (64780)   [] Other:  [] TA x      [] Mech Traction (16844)  [] ES(attended) (58619)      [] ES (un) (63449):         ASSESSMENT:  See eval      GOALS:   Patient stated goal:     [x] Progressing: [] Met: [] Not Met: [] Adjusted    Therapist goals for Patient:   Short Term Goals: To be achieved in: 2 weeks  1. Independent in HEP and progression per patient tolerance, in order to prevent re-injury. [x] Progressing: [] Met: [] Not Met: [] Adjusted   2. Patient will have a decrease in pain to facilitate improvement in movement, function, and ADLs as indicated by Functional Deficits. [x] Progressing: [] Met: [] Not Met: [] Adjusted    Long Term Goals: To be achieved in:   12 weeks  - The patient is expected to demonstrate less than % impairment, limitation or restriction in:  - walking and moving around (mobility)  - Patient will demonstrate increased passive and active ROM to full, symmetrical and painfree to allow for proper joint functioning as indicated by patients Functional Deficits. [x] Progressing: [] Met: [] Not Met: [] Adjusted  - Patient will demonstrate an increase in Strength to full and painfree to resistance testing to allow for proper functional mobility as indicated by patients Functional Deficits.     [x] Progressing: [] Met: [] Not Met: [] Adjusted  - Patient will return to desired, higher level,  functional activities without increased symptoms or restriction. [x] Progressing: [] Met: [] Not Met: [] Adjusted  - Patient will demonstrate negligible proprioceptive asymmetries   [x] Progressing: [] Met: [] Not Met: [] Adjusted            Overall Progression Towards Functional goals/ Treatment Progress Update:  [] Patient is progressing as expected towards functional goals listed. [] Progression is slowed due to complexities/Impairments listed. [] Progression has been slowed due to co-morbidities. [x] Plan just implemented, too soon to assess goals progression <30days   [] Goals require adjustment due to lack of progress  [] Patient is not progressing as expected and requires additional follow up with physician  [] Other    Prognosis for POC: [x] Good [] Fair  [] Poor      Patient requires continued skilled intervention: [x] Yes  [] No    Treatment/Activity Tolerance:  [x] Patient able to complete treatment  [] Patient limited by fatigue  [] Patient limited by pain    [] Patient limited by other medical complications  [] Other:         Return to Play: (if applicable)   []  Stage 1: Intro to Strength   []  Stage 2: Return to Run and Strength   []  Stage 3: Return to Jump and Strength   []  Stage 4: Dynamic Strength and Agility   []  Stage 5: Sport Specific Training     []  Ready to Return to Play, Meets All Above Stages   []  Not Ready for Return to Sports   Comments:                               PLAN: See eval  [x] Continue per plan of care [] Alter current plan (see comments above)  [] Plan of care initiated [] Hold pending MD visit [] Discharge      Electronically signed by:  Melvin Hua PT, MPT     Note: If patient does not return for scheduled/ recommended follow up visits, this note will serve as a discharge from care along with most recent update on progress.

## 2022-10-21 ENCOUNTER — OFFICE VISIT (OUTPATIENT)
Dept: ORTHOPEDIC SURGERY | Age: 37
End: 2022-10-21
Payer: COMMERCIAL

## 2022-10-21 ENCOUNTER — TELEPHONE (OUTPATIENT)
Dept: ORTHOPEDIC SURGERY | Age: 37
End: 2022-10-21

## 2022-10-21 VITALS — HEIGHT: 66 IN | WEIGHT: 128 LBS | BODY MASS INDEX: 20.57 KG/M2

## 2022-10-21 DIAGNOSIS — M25.852 IMPINGEMENT SYNDROME, HIP, LEFT: Primary | ICD-10-CM

## 2022-10-21 PROCEDURE — 99214 OFFICE O/P EST MOD 30 MIN: CPT | Performed by: ORTHOPAEDIC SURGERY

## 2022-10-21 NOTE — PROGRESS NOTES
Dr Guadalupe Murphy      Date /Time 10/21/2022       10:01 AM EDT  Name Linnette Abreu             1985   Location  [address]  MRN [id number]                Chief Complaint   Patient presents with    Hip Pain     Follow up Left Hip Pain        History of Present Illness    Linnette Abreu is a 40 y.o. female who presents with  left hip pain. Occupation:  Stay-at-home mom  Occupational activities: heavy lifting occasionally. Athletic/exercise activity: no sports. Injury Mechanism:  none. Worker's Comp. & legal issues:   none. Previous Treatments: Ice, Heat, and NSAIDs    Patient presents to the office today for a follow-up visit. Patient being treated for left hip impingement and suspected labral tear. She has been in physical therapy since her last visit with not much improvement. She continues to be afraid to do certain activities because of the possibility of pain. Her pain when present is still concentrated in her groin and inner thigh. She also has a C-shaped distribution pain. No new injury or trauma. Previous history: Patient presents to the office today for a new problem. Patient here with a chief complaint of left hip pain. Patient's left hip has been painful off and on for years. She complains of stiffness. Her symptoms have increased over the last several weeks without injury or trauma. Pain mostly concentrated over the anterior-lateral aspect of the hip. Increased pain with hip rotation. No increase in pain with weightbearing activities. She denies lumbar pain or radicular symptoms.     Past History  Past Medical History:   Diagnosis Date    Headache      Past Surgical History:   Procedure Laterality Date    HYSTEROSCOPY       Family History   Problem Relation Age of Onset    Thyroid Disease Mother         hypothyroidism    Seizures Sister     Diabetes Maternal Grandmother     Heart Disease Maternal Grandfather     Heart Surgery Maternal Grandfather     Pancreatic Cancer Paternal Grandfather     Breast Cancer Paternal Aunt     Thyroid Disease Maternal Aunt         hyperthyroidism     Social History     Tobacco Use    Smoking status: Never    Smokeless tobacco: Never   Substance Use Topics    Alcohol use: Yes     Comment: socially      Current Outpatient Medications on File Prior to Visit   Medication Sig Dispense Refill    meloxicam (MOBIC) 15 MG tablet Take 1 tablet by mouth daily 30 tablet 0    Multiple Vitamins-Minerals (THERAPEUTIC MULTIVITAMIN-MINERALS) tablet Take 1 tablet by mouth daily      Omega-3 Fatty Acids (FISH OIL) 1000 MG CAPS Take 3,000 mg by mouth 3 times daily      Probiotic Product (PROBIOTIC-10 PO) Take by mouth       No current facility-administered medications on file prior to visit. ASCVD 10-YEAR RISK SCORE  The ASCVD Risk score (Carl DK, et al., 2019) failed to calculate for the following reasons: The 2019 ASCVD risk score is only valid for ages 36 to 78   . Review of Systems  10-point ROS is negative other than HPI. Physical Exam  Based off 1997 Exam Criteria  Ht 5' 6\" (1.676 m)   Wt 128 lb (58.1 kg)   BMI 20.66 kg/m²      Constitutional:       General: He is not in acute distress. Appearance: Normal appearance. Cardiovascular:      Rate and Rhythm: Normal rate and regular rhythm. Pulses: Normal pulses. Pulmonary:      Effort: Pulmonary effort is normal. No respiratory distress. Neurological:      Mental Status: He is alert and oriented to person, place, and time. Mental status is at baseline. Musculoskeletal:  Gait:  normal    Skin: Clean and intact.   No open sores or wounds    Lymphatics: No palpable lymph nodes    Spine / Hip Exam:      RIGHT  LEFT    Lumbar Spine Exam  [x] All Neg    [x] All Neg     Straight leg raise  []  []Not tested   []  []Not tested    Clonus  []  []Not tested   []  []Not tested    Pain with motion  []  []Not tested   []  []Not tested    Radiculopathy  []  []Not tested   []  []Not tested Paraspinal muscle tenderness  [] Paraspinal  []Midline   [] Paraspinal  []Midline   Sensation RIGHT  LEFT    L3  [x] Normal []Decreased    [x] Normal []Decreased   L4  [x] Normal  []Decreased   [x] Normal []Decreased   L5  [x] Normal []Decreased   [x] Normal []Decreased   S1  [x] Normal  []Decreased   [x] Normal []Decreased   Pelvis       Scoliosis  [x] Nml  [] Present     Leg-length discrepency  [x] Equal  [] Right longer   [] Left longer   Range of Motion Active Passive Active Passive   Hip Flexion 120  120    Abduction 50  50    External Rotation @ 90 flex 65  65    Internal Rotation @ 90 flex 20  20           Hip Impingement / Dysplasia  [x] All Neg  [] Not tested   [] All Neg  [] Not tested    Hip impingement test  []  []Not tested   [x]  []Not tested    C-sign  []  []Not tested   [x]  []Not tested    Anterior instability apprehension  []  []Not tested   []  []Not tested    Posterior instability apprehension  []  []Not tested   []  []Not tested    Uncontained Internal rotation  []  []Not tested  []  []Not tested          Abductors  [x] All Neg  [] Not tested   [x] All Neg  [] Not tested    Medius strength  []  []Not tested   []  []Not tested    Minimum strength  []  []Not tested   []  []Not tested    IT band tendonitis  []  []Not tested   []  []Not tested    Trochanteric tenderness  []  []Not tested  []  []Not tested   Sciatic neuropathic pain  []  []Not tested   []  []Not tested           Post-arthroplasty  [] All Neg  [] Not tested   [] All Neg  [] Not tested    Rectus tendonitis  []  []Not tested   []  []Not tested    Iliopsoas tendonitis       Start-up pain  []  []Not tested   []  []Not tested          Imaging    Left Hip: [address] [facility]  Previous Radiographs: X-rays were ordered today and reviewed of the left hip.  3 views. AP pelvis, lateral, and false profile. They demonstrate no evidence of fractures or dislocations. There is an indention due to hypertrophy of the iliocapsularis muscle. Patient does have a positive ischial spine sign for retroversion of the pelvis. Cam lesion present. The cam lesion seen on x-rays may be more of an attachment of the iliocapsularis muscle. The retroversion is mild. Procedure:  Orders Placed This Encounter   Procedures    MRI HIP LEFT WO CONTRAST     CLOSED SCANNER     Standing Status:   Future     Standing Expiration Date:   10/21/2023     Scheduling Instructions:      [address] [place] [address] Drive       [address] [facility] [address]       [address], [address]             [phone]      Fax [phone]                        Note: Please PUSH IMAGES TO [facility] PACS, Thank you! Order Specific Question:   Reason for exam:     Answer:   MRI Left Hip Labral Tear/ Impingement         Assessment and Plan  Kizna Sherwood was seen today for hip pain. Diagnoses and all orders for this visit:    Impingement syndrome, hip, left  -     MRI HIP LEFT WO CONTRAST; Future        Patient is suffering with hip impingement due to cam lesion and retroversion of her pelvis. Patient will continue with home exercises at this time. We will order the patient an MRI to evaluate for hip impingement and labral tear. Patient will follow-up after MRI to review results. Depending on results of MRI and symptoms at that time consideration for intra-articular cortisone injection before arthroscopic surgery. If MRI does not demonstrate impingement cyst and a labral tear arthroscopic surgery may not be beneficial to the patient. I discussed with Nate Slade that her history, symptoms, signs, and imaging are most consistent with femoro-acetabular impingement    We reviewed the natural history of these conditions and treatment options ranging from conservative measures (rest, icing, activity modification, physical therapy, pain meds, cortisone injection) to surgical options.      In terms of treatment, I recommended continuing with rest, icing, avoidance of painful activities, NSAIDs or pain meds as tolerated, and physical therapy. If these are not effective, cortisone injection can be considered. We discussed surgical options as well, should conservative measures fail. Electronically signed by Madalyn Varma MD on 10/21/2022 at 8:51 AM  This dictation was generated by voice recognition computer software. Although all attempts are made to edit the dictation for accuracy, there may be errors in the transcription that are not intended.

## 2022-10-21 NOTE — TELEPHONE ENCOUNTER
MRI LEFT HIP no precert required     [facility] Faxed     Patient Notified/ Follow up 24/48 hours after MRI     jm

## 2022-10-24 ENCOUNTER — TELEPHONE (OUTPATIENT)
Dept: ORTHOPEDIC SURGERY | Age: 37
End: 2022-10-24

## 2022-10-24 NOTE — TELEPHONE ENCOUNTER
General Question     Subject: ORDER FOR MRI  Patient and /or Facility Request: Michael First  Contact Number:  [phone]    PT STATES SHE CALLED TO SCHEDULE MRI AND THEY SAID THEY DID NOT HAVE ORDER. PT NEEDS ORDER FOR MRI RESENT FOR HER TO SCHEDULE AND REQ CALLBACK AT NUMBER LISTED ABOVE TO CONFIRM WHEN ITS SENT.

## 2022-11-11 ENCOUNTER — OFFICE VISIT (OUTPATIENT)
Dept: ORTHOPEDIC SURGERY | Age: 37
End: 2022-11-11
Payer: COMMERCIAL

## 2022-11-11 VITALS — HEIGHT: 66 IN | BODY MASS INDEX: 20.57 KG/M2 | WEIGHT: 128 LBS

## 2022-11-11 DIAGNOSIS — M25.852 IMPINGEMENT SYNDROME, HIP, LEFT: Primary | ICD-10-CM

## 2022-11-11 PROCEDURE — 20611 DRAIN/INJ JOINT/BURSA W/US: CPT | Performed by: ORTHOPAEDIC SURGERY

## 2022-11-11 RX ORDER — LIDOCAINE HYDROCHLORIDE 10 MG/ML
10 INJECTION, SOLUTION INFILTRATION; PERINEURAL ONCE
Status: COMPLETED | OUTPATIENT
Start: 2022-11-11 | End: 2022-11-11

## 2022-11-11 RX ORDER — BUPIVACAINE HYDROCHLORIDE 2.5 MG/ML
2.5 INJECTION, SOLUTION INFILTRATION; PERINEURAL ONCE
Status: COMPLETED | OUTPATIENT
Start: 2022-11-11 | End: 2022-11-11

## 2022-11-11 RX ORDER — BETAMETHASONE SODIUM PHOSPHATE AND BETAMETHASONE ACETATE 3; 3 MG/ML; MG/ML
12 INJECTION, SUSPENSION INTRA-ARTICULAR; INTRALESIONAL; INTRAMUSCULAR; SOFT TISSUE ONCE
Status: COMPLETED | OUTPATIENT
Start: 2022-11-11 | End: 2022-11-11

## 2022-11-11 RX ADMIN — BUPIVACAINE HYDROCHLORIDE 2.5 MG: 2.5 INJECTION, SOLUTION INFILTRATION; PERINEURAL at 09:52

## 2022-11-11 RX ADMIN — BETAMETHASONE SODIUM PHOSPHATE AND BETAMETHASONE ACETATE 12 MG: 3; 3 INJECTION, SUSPENSION INTRA-ARTICULAR; INTRALESIONAL; INTRAMUSCULAR; SOFT TISSUE at 09:51

## 2022-11-11 RX ADMIN — LIDOCAINE HYDROCHLORIDE 10 MG: 10 INJECTION, SOLUTION INFILTRATION; PERINEURAL at 09:51

## 2022-11-11 NOTE — PROGRESS NOTES
Dr Ab Hernandez      Date /Time 11/11/2022       10:01 AM EDT  Name Nik Roman             1985   Location  [facility]  MRN [id number]                Chief Complaint   Patient presents with    Hip Pain     MRI RESULTS          History of Present Illness    Nik Roman is a 40 y.o. female who presents with  left hip pain. Occupation:  Stay-at-home mom  Occupational activities: heavy lifting occasionally. Athletic/exercise activity: no sports. Injury Mechanism:  none. Worker's Comp. & legal issues:   none. Previous Treatments: Ice, Heat, and NSAIDs    Patient presents to the office today for a follow-up visit. Patient being treated for left hip impingement and suspected labral tear. She has been in physical therapy since her last visit with not much improvement. She continues to be afraid to do certain activities because of the possibility of pain. Her pain when present is still concentrated in her groin and inner thigh. She also has a C-shaped distribution pain. No new injury or trauma. At patient's last visit we did order her an MRI. Patient is here to review results. Previous history: Patient presents to the office today for a new problem. Patient here with a chief complaint of left hip pain. Patient's left hip has been painful off and on for years. She complains of stiffness. Her symptoms have increased over the last several weeks without injury or trauma. Pain mostly concentrated over the anterior-lateral aspect of the hip. Increased pain with hip rotation. No increase in pain with weightbearing activities. She denies lumbar pain or radicular symptoms.     Past History  Past Medical History:   Diagnosis Date    Headache      Past Surgical History:   Procedure Laterality Date    HYSTEROSCOPY       Family History   Problem Relation Age of Onset    Thyroid Disease Mother         hypothyroidism    Seizures Sister     Diabetes Maternal Grandmother     Heart Disease Maternal Grandfather     Heart Surgery Maternal Grandfather     Pancreatic Cancer Paternal Grandfather     Breast Cancer Paternal Aunt     Thyroid Disease Maternal Aunt         hyperthyroidism     Social History     Tobacco Use    Smoking status: Never    Smokeless tobacco: Never   Substance Use Topics    Alcohol use: Yes     Comment: socially      Current Outpatient Medications on File Prior to Visit   Medication Sig Dispense Refill    meloxicam (MOBIC) 15 MG tablet Take 1 tablet by mouth daily 30 tablet 0    Multiple Vitamins-Minerals (THERAPEUTIC MULTIVITAMIN-MINERALS) tablet Take 1 tablet by mouth daily      Omega-3 Fatty Acids (FISH OIL) 1000 MG CAPS Take 3,000 mg by mouth 3 times daily      Probiotic Product (PROBIOTIC-10 PO) Take by mouth       No current facility-administered medications on file prior to visit. ASCVD 10-YEAR RISK SCORE  The ASCVD Risk score (Carl DK, et al., 2019) failed to calculate for the following reasons: The 2019 ASCVD risk score is only valid for ages 36 to 78   . Review of Systems  10-point ROS is negative other than HPI. Physical Exam  Based off 1997 Exam Criteria  Ht 5' 6\" (1.676 m)   Wt 128 lb (58.1 kg)   BMI 20.66 kg/m²      Constitutional:       General: He is not in acute distress. Appearance: Normal appearance. Cardiovascular:      Rate and Rhythm: Normal rate and regular rhythm. Pulses: Normal pulses. Pulmonary:      Effort: Pulmonary effort is normal. No respiratory distress. Neurological:      Mental Status: He is alert and oriented to person, place, and time. Mental status is at baseline. Musculoskeletal:  Gait:  normal    Skin: Clean and intact.   No open sores or wounds    Lymphatics: No palpable lymph nodes    Spine / Hip Exam:      RIGHT  LEFT    Lumbar Spine Exam  [x] All Neg    [x] All Neg     Straight leg raise  []  []Not tested   []  []Not tested    Clonus  []  []Not tested   []  []Not tested    Pain with motion  []  []Not tested []  []Not tested    Radiculopathy  []  []Not tested   []  []Not tested    Paraspinal muscle tenderness  [] Paraspinal  []Midline   [] Paraspinal  []Midline   Sensation RIGHT  LEFT    L3  [x] Normal []Decreased    [x] Normal []Decreased   L4  [x] Normal  []Decreased   [x] Normal []Decreased   L5  [x] Normal []Decreased   [x] Normal []Decreased   S1  [x] Normal  []Decreased   [x] Normal []Decreased   Pelvis       Scoliosis  [x] Nml  [] Present     Leg-length discrepency  [x] Equal  [] Right longer   [] Left longer   Range of Motion Active Passive Active Passive   Hip Flexion 120  120    Abduction 50  50    External Rotation @ 90 flex 65  65    Internal Rotation @ 90 flex 20  20           Hip Impingement / Dysplasia  [x] All Neg  [] Not tested   [] All Neg  [] Not tested    Hip impingement test  []  []Not tested   [x]  []Not tested    C-sign  []  []Not tested   [x]  []Not tested    Anterior instability apprehension  []  []Not tested   []  []Not tested    Posterior instability apprehension  []  []Not tested   []  []Not tested    Uncontained Internal rotation  []  []Not tested  []  []Not tested          Abductors  [x] All Neg  [] Not tested   [x] All Neg  [] Not tested    Medius strength  []  []Not tested   []  []Not tested    Minimum strength  []  []Not tested   []  []Not tested    IT band tendonitis  []  []Not tested   []  []Not tested    Trochanteric tenderness  []  []Not tested  []  []Not tested   Sciatic neuropathic pain  []  []Not tested   []  []Not tested           Post-arthroplasty  [] All Neg  [] Not tested   [] All Neg  [] Not tested    Rectus tendonitis  []  []Not tested   []  []Not tested    Iliopsoas tendonitis       Start-up pain  []  []Not tested   []  []Not tested      Markedly positive impingement sign present. Imaging    Left Hip: [facility]  Previous Radiographs: X-rays were ordered today and reviewed of the left hip.  3 views. AP pelvis, lateral, and false profile.   They demonstrate no evidence of fractures or dislocations. There is an indention due to hypertrophy of the iliocapsularis muscle. Patient does have a positive ischial spine sign for mild retroversion of the pelvis. Tiny cam lesion present. The cam lesion seen on x-rays may be more of an attachment of the iliocapsularis muscle. The retroversion is mild. My personal interpretation of the MRI demonstrates degenerative fraying, and high-grade partial-thickness tear of the labrum with some displacement over the anterior superior rim. On the sagittal view, there clearly is damage to the labrum as it is subluxed within the joint and there is separation of the chondral labral junction. Procedure:  Orders Placed This Encounter   Procedures    US ARTHR/ASP/INJ MAJOR JNT/BURSA LEFT     Standing Status:   Future     Number of Occurrences:   1     Standing Expiration Date:   12/11/2022     Order Specific Question:   Reason for exam:     Answer:   PAIN           Assessment and Plan  Mazie Kanner was seen today for hip pain. 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 Patient will continue with home exercises at this time. We will order the patient an MRI to evaluate for hip impingement and labral tear.       I discussed with Lazarus Oseguera that her history, symptoms, signs, and imaging are most consistent with labral tear and femoro-acetabular impingement    We reviewed the natural history of these conditions and treatment options ranging from conservative measures (rest, icing, activity modification, physical therapy, pain meds, cortisone injection) to surgical options. In terms of treatment, I recommended continuing with rest, icing, avoidance of painful activities, NSAIDs or pain meds as tolerated, and physical therapy. Left Hip Intra-articular Cortisone Injection - Ultrasound-guided CPT: 48743  Consent was obtained after discussion of the risks, benefits, alternatives, including, but not limited to bleeding, pain, infection, skin disruption or discoloration. Laterality was confirmed (timeout). The hip was prepped with alcohol. Deep ultrasound was used to visualize the femoral head, neck, and acetabular rim. 22-gauge spinal needle was used. I confirmed transducer orientation along the axis of the femoral neck. SonTRAFI ultrasound unit was used with a variable frequency (6.0-15.0 MHz) linear transducer. Ultrasound-guided needle localization to the hip joint was performed. Confirmation of needle placement was performed, and the image was stored  A formulation of 2cc of Celestone, 1cc of 1% lidocaine, 1cc of 0.25% sensoricaine was injected into the hip. Appropriate insufflation deep to the hip capsule was visualized. The site was cleaned and dressed with a band aid. Images were taken and saved for permanent record. She tolerated this well and there were no complications. We tried reaching out after injection to determine how much pain relief she has gotten. Unfortunately, we are unable to reach her. We will reconvene at next visit. We discussed surgical options as well, should conservative measures fail. Arthroscopic pincer correction with labral repair may be a reasonable option if she gets good relief from the cortisone injection. Electronically signed by Noelle Abdullahi MD on 11/11/2022 at 12:03 PM  This dictation was generated by voice recognition computer software. Although all attempts are made to edit the dictation for accuracy, there may be errors in the transcription that are not intended.

## 2022-12-07 ENCOUNTER — TELEPHONE (OUTPATIENT)
Dept: INTERNAL MEDICINE CLINIC | Age: 37
End: 2022-12-07

## 2022-12-07 NOTE — TELEPHONE ENCOUNTER
----- Message from [place] Kristy sent at 12/7/2022  2:12 PM EST -----  Subject: Appointment Request    Reason for Call: Established Patient Appointment needed: Routine Physical   Exam with PAP    QUESTIONS    Reason for appointment request? No appointments available during search     Additional Information for Provider? Patient is looking to come in for a   physical with a pap smear but the provider didn't have anything available.    The patient express she is looking to come in at the beginning of the year   if possible.   ---------------------------------------------------------------------------  --------------  [address]  [phone]; OK to leave message on voicemail  ---------------------------------------------------------------------------  --------------  SCRIPT ANSWERS  HONORIO Screen: Levar Saeed

## 2023-01-13 ENCOUNTER — OFFICE VISIT (OUTPATIENT)
Dept: INTERNAL MEDICINE CLINIC | Age: 38
End: 2023-01-13
Payer: COMMERCIAL

## 2023-01-13 VITALS
SYSTOLIC BLOOD PRESSURE: 92 MMHG | WEIGHT: 129 LBS | BODY MASS INDEX: 20.73 KG/M2 | HEIGHT: 66 IN | DIASTOLIC BLOOD PRESSURE: 70 MMHG | TEMPERATURE: 99 F

## 2023-01-13 DIAGNOSIS — Z00.00 WELL ADULT EXAM: ICD-10-CM

## 2023-01-13 DIAGNOSIS — Z12.4 SCREENING FOR CERVICAL CANCER: Primary | ICD-10-CM

## 2023-01-13 LAB
A/G RATIO: 1.7 (ref 1.1–2.2)
ALBUMIN SERPL-MCNC: 4.4 G/DL (ref 3.4–5)
ALP BLD-CCNC: 42 U/L (ref 40–129)
ALT SERPL-CCNC: 11 U/L (ref 10–40)
ANION GAP SERPL CALCULATED.3IONS-SCNC: 15 MMOL/L (ref 3–16)
AST SERPL-CCNC: 19 U/L (ref 15–37)
BILIRUB SERPL-MCNC: 0.5 MG/DL (ref 0–1)
BUN BLDV-MCNC: 12 MG/DL (ref 7–20)
CALCIUM SERPL-MCNC: 9.6 MG/DL (ref 8.3–10.6)
CHLORIDE BLD-SCNC: 99 MMOL/L (ref 99–110)
CHOLESTEROL, TOTAL: 218 MG/DL (ref 0–199)
CO2: 21 MMOL/L (ref 21–32)
CREAT SERPL-MCNC: 0.8 MG/DL (ref 0.6–1.1)
ESTRADIOL LEVEL: 188 PG/ML
FOLLICLE STIMULATING HORMONE: 3 MIU/ML
GFR SERPL CREATININE-BSD FRML MDRD: >60 ML/MIN/{1.73_M2}
GLUCOSE BLD-MCNC: 86 MG/DL (ref 70–99)
HDLC SERPL-MCNC: 99 MG/DL (ref 40–60)
LDL CHOLESTEROL CALCULATED: 108 MG/DL
POTASSIUM SERPL-SCNC: 4.1 MMOL/L (ref 3.5–5.1)
SODIUM BLD-SCNC: 135 MMOL/L (ref 136–145)
TOTAL PROTEIN: 7 G/DL (ref 6.4–8.2)
TRIGL SERPL-MCNC: 53 MG/DL (ref 0–150)
TSH REFLEX: 1.95 UIU/ML (ref 0.27–4.2)
VLDLC SERPL CALC-MCNC: 11 MG/DL

## 2023-01-13 PROCEDURE — 99395 PREV VISIT EST AGE 18-39: CPT | Performed by: INTERNAL MEDICINE

## 2023-01-13 SDOH — ECONOMIC STABILITY: FOOD INSECURITY: WITHIN THE PAST 12 MONTHS, THE FOOD YOU BOUGHT JUST DIDN'T LAST AND YOU DIDN'T HAVE MONEY TO GET MORE.: NEVER TRUE

## 2023-01-13 SDOH — ECONOMIC STABILITY: FOOD INSECURITY: WITHIN THE PAST 12 MONTHS, YOU WORRIED THAT YOUR FOOD WOULD RUN OUT BEFORE YOU GOT MONEY TO BUY MORE.: NEVER TRUE

## 2023-01-13 ASSESSMENT — SOCIAL DETERMINANTS OF HEALTH (SDOH): HOW HARD IS IT FOR YOU TO PAY FOR THE VERY BASICS LIKE FOOD, HOUSING, MEDICAL CARE, AND HEATING?: NOT HARD AT ALL

## 2023-01-13 ASSESSMENT — PATIENT HEALTH QUESTIONNAIRE - PHQ9
SUM OF ALL RESPONSES TO PHQ QUESTIONS 1-9: 0
1. LITTLE INTEREST OR PLEASURE IN DOING THINGS: 0
SUM OF ALL RESPONSES TO PHQ QUESTIONS 1-9: 0
SUM OF ALL RESPONSES TO PHQ9 QUESTIONS 1 & 2: 0
SUM OF ALL RESPONSES TO PHQ QUESTIONS 1-9: 0
2. FEELING DOWN, DEPRESSED OR HOPELESS: 0
SUM OF ALL RESPONSES TO PHQ QUESTIONS 1-9: 0

## 2023-01-13 NOTE — PROGRESS NOTES
2023    Jerry Hennessy (:  1985) marta 40 y.o. female, here for a preventive medicine evaluation. There is no problem list on file for this patient. Overall doing well. She is been dealing with some hip pain but seeing orthopedist.  Shayan Nepalese noticing shorter menstrual cycles, no increase in bleeding, no other symptoms. No family history of early onset menopause to her knowledge. Review of Systems   Constitutional:  Negative for fatigue and unexpected weight change. HENT:  Negative for ear pain and sinus pain. Eyes:  Negative for visual disturbance. Respiratory:  Negative for cough and shortness of breath. Cardiovascular:  Negative for chest pain and leg swelling. Gastrointestinal:  Negative for constipation and diarrhea. Genitourinary:  Positive for menstrual problem (shorter cycles). Negative for difficulty urinating and dyspareunia. Musculoskeletal:  Positive for arthralgias (hip pain - seeing ortho). Negative for back pain. Skin:  Negative for rash. Neurological:  Negative for weakness and numbness. Psychiatric/Behavioral:  Negative for dysphoric mood. The patient is not nervous/anxious. Prior to Visit Medications    Not on File        Allergies   Allergen Reactions    Oxycodone        Past Medical History:   Diagnosis Date    GERD (gastroesophageal reflux disease) stomach pain    diarrhea    Headache        Past Surgical History:   Procedure Laterality Date    HYSTEROSCOPY         Social History     Socioeconomic History    Marital status:      Spouse name: Not on file    Number of children: Not on file    Years of education: Not on file    Highest education level: Not on file   Occupational History    Not on file   Tobacco Use    Smoking status: Never    Smokeless tobacco: Never   Substance and Sexual Activity    Alcohol use:  Yes     Alcohol/week: 4.0 standard drinks     Types: 4 Glasses of wine per week     Comment: socially    Drug use: Never    Sexual activity: Yes     Partners: Male   Other Topics Concern    Not on file   Social History Narrative    Not on file     Social Determinants of Health     Financial Resource Strain: Low Risk     Difficulty of Paying Living Expenses: Not hard at all   Food Insecurity: No Food Insecurity    Worried About Running Out of Food in the Last Year: Never true    Ran Out of Food in the Last Year: Never true   Transportation Needs: Not on file   Physical Activity: Not on file   Stress: Not on file   Social Connections: Not on file   Intimate Partner Violence: Not on file   Housing Stability: Not on file        Family History   Problem Relation Age of Onset    Thyroid Disease Mother         hypothyroidism    Seizures Sister     Diabetes Maternal Grandmother     Heart Disease Maternal Grandfather     Heart Surgery Maternal Grandfather     Pancreatic Cancer Paternal Grandfather     Breast Cancer Paternal Aunt     Thyroid Disease Maternal Aunt         hyperthyroidism       ADVANCEDIRECTIVE: N, <no information>    Vitals:    01/13/23 0832   BP: 92/70   Site: Left Upper Arm   Position: Sitting   Temp: 99 °F (37.2 °C)   Weight: 129 lb (58.5 kg)   Height: 5' 6\" (1.676 m)     Estimated body mass index is 20.82 kg/m² as calculated from the following:    Height as of this encounter: 5' 6\" (1.676 m). Weight as of this encounter: 129 lb (58.5 kg). Physical Exam  Vitals reviewed. Exam conducted with a chaperone present. Constitutional:       General: She is not in acute distress. Appearance: Normal appearance. She is well-developed. HENT:      Head: Normocephalic and atraumatic. Right Ear: Tympanic membrane, ear canal and external ear normal.      Left Ear: Tympanic membrane, ear canal and external ear normal.   Eyes:      General: No scleral icterus. Conjunctiva/sclera: Conjunctivae normal.   Neck:      Thyroid: No thyroid mass, thyromegaly or thyroid tenderness. Vascular: No carotid bruit.    Cardiovascular: Rate and Rhythm: Normal rate and regular rhythm. Heart sounds: Normal heart sounds. Pulmonary:      Effort: Pulmonary effort is normal. No respiratory distress. Breath sounds: Normal breath sounds. Chest:   Breasts:     Right: Normal. No mass or nipple discharge. Left: Normal. No mass or nipple discharge. Abdominal:      General: Abdomen is flat. Bowel sounds are normal. There is no distension. Palpations: Abdomen is soft. There is no mass. Tenderness: There is no abdominal tenderness. Hernia: No hernia is present. Genitourinary:     General: Normal vulva. Vagina: Normal. No foreign body. No vaginal discharge, erythema or tenderness. Cervix: No cervical motion tenderness or discharge. Uterus: Normal.       Adnexa:         Right: No mass, tenderness or fullness. Left: No mass, tenderness or fullness. Comments: Some erythema around the cervical os, may be about to start menses  Musculoskeletal:      Cervical back: Neck supple. Right lower leg: No edema. Left lower leg: No edema. Lymphadenopathy:      Cervical: No cervical adenopathy. Skin:     General: Skin is warm and dry. Neurological:      General: No focal deficit present. Mental Status: She is alert. Gait: Gait normal.   Psychiatric:         Mood and Affect: Mood normal.         Behavior: Behavior normal.         Thought Content: Thought content normal.         Judgment: Judgment normal.       No flowsheet data found. Lab Results   Component Value Date/Time    CHOL 218 01/13/2023 09:26 AM    CHOL 202 10/20/2021 11:03 AM    TRIG 53 01/13/2023 09:26 AM    TRIG 35 10/20/2021 11:03 AM    HDL 99 01/13/2023 09:26 AM    HDL 85 10/20/2021 11:03 AM    LDLCALC 108 01/13/2023 09:26 AM    LDLCALC 110 10/20/2021 11:03 AM    GLUCOSE 86 01/13/2023 09:26 AM       The ASCVD Risk score (Carl DK, et al., 2019) failed to calculate for the following reasons:     The 2019 ASCVD risk score is only valid for ages 36 to 78      There is no immunization history on file for this patient. Health Maintenance   Topic Date Due    COVID-19 Vaccine (1) Never done    DTaP/Tdap/Td vaccine (1 - Tdap) Never done    Flu vaccine (1) Never done    Depression Screen  01/13/2024    Cervical cancer screen  10/20/2024    Hepatitis C screen  Completed    HIV screen  Completed    Hepatitis A vaccine  Aged Out    Hib vaccine  Aged Out    Meningococcal (ACWY) vaccine  Aged Out    Pneumococcal 0-64 years Vaccine  Aged Out    Varicella vaccine  Discontinued       ASSESSMENT/PLAN:  1. Well adult exam  - Discussed age appropriate preventive care including healthy diet, daily exercise, immunizations and age & gender guided screening tests. - Comprehensive Metabolic Panel; Future  - Lipid Panel; Future  - TSH with Reflex; Future  - Follicle Stimulating Hormone; Future  - Estradiol; Future    2. Screening for cervical cancer  - PAP SMEAR    Return in about 1 year (around 1/13/2024) for CPE.

## 2023-01-15 ASSESSMENT — ENCOUNTER SYMPTOMS
BACK PAIN: 0
SINUS PAIN: 0
COUGH: 0
DIARRHEA: 0
SHORTNESS OF BREATH: 0
CONSTIPATION: 0

## 2023-02-17 ENCOUNTER — OFFICE VISIT (OUTPATIENT)
Dept: ORTHOPEDIC SURGERY | Age: 38
End: 2023-02-17

## 2023-02-17 VITALS — HEIGHT: 66 IN | BODY MASS INDEX: 20.73 KG/M2 | WEIGHT: 129 LBS

## 2023-02-17 DIAGNOSIS — M25.852 IMPINGEMENT SYNDROME, HIP, LEFT: Primary | ICD-10-CM

## 2023-02-17 DIAGNOSIS — S73.192A TEAR OF LEFT ACETABULAR LABRUM, INITIAL ENCOUNTER: ICD-10-CM

## 2023-02-17 NOTE — PROGRESS NOTES
Dr Zo Holliday      Date /Time 2/17/2023       10:01 AM EDT  Name Cole Shah             1985   Location  [facility]  MRN [id number]                Chief Complaint   Patient presents with    Follow-up     Left Hip (Cortisone 11/11/22)          History of Present Illness    Cole Shah is a 40 y.o. female who presents with  left hip pain. Occupation:  Stay-at-home mom  Occupational activities: heavy lifting occasionally. Athletic/exercise activity: no sports. Injury Mechanism:  none. Worker's Comp. & legal issues:   none. Previous Treatments: Ice, Heat, and NSAIDs    Patient is suffering with left hip impingement and labral tear. Patient did receive an intra-articular cortisone injection at her last visit. The injection caused moderate relief of her symptoms. Her pain has returned. No new injury or trauma. Patient presents to the office today for a follow-up visit. Patient being treated for left hip impingement and suspected labral tear. She has been in physical therapy since her last visit with not much improvement. She continues to be afraid to do certain activities because of the possibility of pain. Her pain when present is still concentrated in her groin and inner thigh. She also has a C-shaped distribution pain. No new injury or trauma. At patient's last visit we did order her an MRI. Patient is here to review results. Previous history: Patient presents to the office today for a new problem. Patient here with a chief complaint of left hip pain. Patient's left hip has been painful off and on for years. She complains of stiffness. Her symptoms have increased over the last several weeks without injury or trauma. Pain mostly concentrated over the anterior-lateral aspect of the hip. Increased pain with hip rotation. No increase in pain with weightbearing activities. She denies lumbar pain or radicular symptoms.     Past History  Past Medical History:   Diagnosis Date    GERD (gastroesophageal reflux disease) stomach pain    diarrhea    Headache      Past Surgical History:   Procedure Laterality Date    HYSTEROSCOPY       Family History   Problem Relation Age of Onset    Thyroid Disease Mother         hypothyroidism    Seizures Sister     Diabetes Maternal Grandmother     Heart Disease Maternal Grandfather     Heart Surgery Maternal Grandfather     Pancreatic Cancer Paternal Grandfather     Breast Cancer Paternal Aunt     Thyroid Disease Maternal Aunt         hyperthyroidism     Social History     Tobacco Use    Smoking status: Never    Smokeless tobacco: Never   Substance Use Topics    Alcohol use: Yes     Alcohol/week: 4.0 standard drinks     Types: 4 Glasses of wine per week     Comment: socially      No current outpatient medications on file prior to visit. No current facility-administered medications on file prior to visit. ASCVD 10-YEAR RISK SCORE  The ASCVD Risk score (Carl DK, et al., 2019) failed to calculate for the following reasons: The 2019 ASCVD risk score is only valid for ages 36 to 78   . Review of Systems  10-point ROS is negative other than HPI. Physical Exam  Based off 1997 Exam Criteria  Ht 5' 6\" (1.676 m)   Wt 129 lb (58.5 kg)   BMI 20.82 kg/m²      Constitutional:       General: He is not in acute distress. Appearance: Normal appearance. Cardiovascular:      Rate and Rhythm: Normal rate and regular rhythm. Pulses: Normal pulses. Pulmonary:      Effort: Pulmonary effort is normal. No respiratory distress. Neurological:      Mental Status: He is alert and oriented to person, place, and time. Mental status is at baseline. Musculoskeletal:  Gait:  normal    Skin: Clean and intact.   No open sores or wounds    Lymphatics: No palpable lymph nodes    Spine / Hip Exam:      RIGHT  LEFT    Lumbar Spine Exam  [x] All Neg    [x] All Neg     Straight leg raise  []  []Not tested   []  []Not tested    Clonus  []  []Not tested []  []Not tested    Pain with motion  []  []Not tested   []  []Not tested    Radiculopathy  []  []Not tested   []  []Not tested    Paraspinal muscle tenderness  [] Paraspinal  []Midline   [] Paraspinal  []Midline   Sensation RIGHT  LEFT    L3  [x] Normal []Decreased    [x] Normal []Decreased   L4  [x] Normal  []Decreased   [x] Normal []Decreased   L5  [x] Normal []Decreased   [x] Normal []Decreased   S1  [x] Normal  []Decreased   [x] Normal []Decreased   Pelvis       Scoliosis  [x] Nml  [] Present     Leg-length discrepency  [x] Equal  [] Right longer   [] Left longer   Range of Motion Active Passive Active Passive   Hip Flexion 120  120    Abduction 50  50    External Rotation @ 90 flex 65  65    Internal Rotation @ 90 flex 20  20           Hip Impingement / Dysplasia  [x] All Neg  [] Not tested   [] All Neg  [] Not tested    Hip impingement test  []  []Not tested   [x]  []Not tested    C-sign  []  []Not tested   [x]  []Not tested    Anterior instability apprehension  []  []Not tested   []  []Not tested    Posterior instability apprehension  []  []Not tested   []  []Not tested    Uncontained Internal rotation  []  []Not tested  []  []Not tested          Abductors  [x] All Neg  [] Not tested   [x] All Neg  [] Not tested    Medius strength  []  []Not tested   []  []Not tested    Minimum strength  []  []Not tested   []  []Not tested    IT band tendonitis  []  []Not tested   []  []Not tested    Trochanteric tenderness  []  []Not tested  []  []Not tested   Sciatic neuropathic pain  []  []Not tested   []  []Not tested           Post-arthroplasty  [] All Neg  [] Not tested   [] All Neg  [] Not tested    Rectus tendonitis  []  []Not tested   []  []Not tested    Iliopsoas tendonitis       Start-up pain  []  []Not tested   []  []Not tested      Markedly positive impingement sign present. Imaging    Left Hip: [facility]  Previous Radiographs: X-rays were ordered today and reviewed of the left hip.  3 views. AP pelvis, lateral, and false profile. They demonstrate no evidence of fractures or dislocations. There is an indention due to hypertrophy of the iliocapsularis muscle. Patient does have a positive ischial spine sign for mild retroversion of the pelvis. Tiny cam lesion present. The cam lesion seen on x-rays may be more of an attachment of the iliocapsularis muscle. The retroversion is mild. My personal interpretation of the MRI demonstrates degenerative fraying, and high-grade partial-thickness tear of the labrum with some displacement over the anterior superior rim. On the sagittal view, there clearly is damage to the labrum as it is subluxed within the joint and there is separation of the chondral labral junction. Procedure:  Orders Placed This Encounter   Procedures    [facility] (Ortho & [facility])- [facility]     Referral Priority:   Routine     Referral Type:   Eval and Treat     Referral Reason:   Specialty Services Required     Requested Specialty:   Physical Therapist     Number of Visits Requested:   1           Assessment and Plan  [facility] was seen today for follow-up. 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 Patient also has a labral tear. Patient will continue with home exercises at this time. She is planning for hip scope this fall. She will need an arthroscopic femoroplasty, acetabular osteoplasty, and labral repair.     I discussed with Shu Barton that her history, symptoms, signs, and imaging are most consistent with labral tear and femoro-acetabular impingement    We reviewed the natural history of these conditions and treatment options ranging from conservative measures (rest, icing, activity modification, physical therapy, pain meds, cortisone injection) to surgical options. In terms of treatment, I recommended continuing with rest, icing, avoidance of painful activities, NSAIDs or pain meds as tolerated, and physical therapy. We discussed surgical options as well, should conservative measures fail. Arthroscopic pincer correction with labral repair may be a reasonable option if she gets good relief from the cortisone injection. Electronically signed by Danii Diaz MD on 2/17/2023 at 12:06 PM  This dictation was generated by voice recognition computer software. Although all attempts are made to edit the dictation for accuracy, there may be errors in the transcription that are not intended.

## 2023-03-01 ENCOUNTER — HOSPITAL ENCOUNTER (OUTPATIENT)
Dept: PHYSICAL THERAPY | Age: 38
Setting detail: THERAPIES SERIES
Discharge: HOME | End: 2023-03-01
Payer: COMMERCIAL

## 2023-03-01 PROCEDURE — 97161 PT EVAL LOW COMPLEX 20 MIN: CPT | Performed by: PHYSICAL THERAPIST

## 2023-03-04 NOTE — PLAN OF CARE
[facility] and [address],  Sports Performance and Rehabilitation, [facility] [address]  [address] [facility],[address]   Rhonda Contreras  Phone: [phone]  Fax: [phone]       Physical Therapy Certification    Dear dr Kamila Madrigal  ,    We had the pleasure of evaluating the following patient for physical therapy services at [address] [place] [address]. A summary of our findings can be found in the initial assessment below. This includes our plan of care. If you have any questions or concerns regarding these findings, please do not hesitate to contact me at the office phone number checked above. Thank you for the referral.       Physician Signature:_______________________________Date:__________________  By signing above (or electronic signature), therapists plan is approved by physician      Patient: Yossi Hernandez   : 1985   MRN: [id number]  Referring Physician:        Evaluation Date: 3/1/2023      Medical Diagnosis Information:    M25.852 (ICD-10-CM) - Impingement syndrome, hip, left         Left hip pain - m25.552                                            Insurance information:       Precautions/ Contra-indications:   Latex Allergy:  [x]NO      []YES  Preferred Language for Healthcare:   [x]English       []Other:  C-SSRS Triggered by Intake questionnaire (Past 2 wk assessment):   [x] No, Questionnaire did not trigger screening.   [] Yes, Patient intake triggered further evaluation      [] C-SSRS Screening completed  [] PCP notified via Plan of Care  [] Emergency services notified      SUBJECTIVE: Patient stated complaint:  reports that the overall improvement from her injection was 'not lasting'.   Back for an eval and distribution of a home program.      Relevant Medical History:    Functional Disability Index:  38% lefs    Pain Scale: 7/10    Easing factors:rest/change of position     Provocative factors:  deep sitting/standing    Night Pain:    - complained of night pain associated with sleep position. Type:   - Constant          Paresthesia complaints:   - no paresthesia complaints       Functional Limitations/Impairments:   - Standing   - Walking      - Squatting/bending    - Stairs             - ADL's   - Sports/Recreation         Occupation/School:     - labor intensive occupation     Living Status/Prior Level of Function: This patient was independent in ADL's and IADL's prior to onset of symptoms. Sport/recreational activities:   - resistive training   - cardiovascular training         PACEMAKER:  - Denied having a pacemaker that would contraindicate the use of electrical modalities. METAL IMPLANTS:  - Denied metal implants that would contraindicate the use of thermal modalities. CANCER HISTORY:  - Denied a history of cancer that would contraindicate thermal modalities. OBJECTIVE:        Joint mobility:     Hypo    Palpation: + PPT @ anterior/lateral/post musculature     Functional Mobility/Transfers:     Posture:     Circumferential Measures:    ROM:  decreased left hip arom by 25%    Strength/Neuromuscular control:  4/5 left hip strength     Bandages/Dressings/Incisions:     Gait: (include devices/WB status)    Dermatomal Sensation:  - Dermatomal sensation was intact to light touch bilaterally throughout upper and lower extremities. Deep tendon reflexes:  - DTR's were symmetrical and intact throughout. Orthopedic Special Tests:                [x] Patient history, allergies, meds reviewed. Medical chart reviewed. See intake form. Review Of Systems (ROS):  [x]Performed Review of systems (Integumentary, CardioPulmonary, Neurological) by intake and observation. Intake form has been scanned into medical record. Patient has been instructed to contact their primary care physician regarding ROS issues if not already being addressed at this time.       Objective Review of Systems:  Cognitive, Communication, Behavior and Learning:  - The patient was alert, spoke coherently and was oriented to person, place and time. - Demonstrated no barriers to communication or processing information.  - Appeared literate, spoke [place] and had no significant hearing or vision impairment. - Had no abnormal behavioral responses, learning preferences or educational needs. ASSESSMENT:    The patient demonstrated at least 37% but less than 39% impairment, limitation or restriction in:   - walking and moving around (mobility)     Functional Impairments:     [x]Noted lumbar/proximal hip/LE hypomobility   [x]Decreased LE functional ROM   [x]Decreased core/proximal hip strength and neuromuscular control   [x]Decreased LE functional strength   [x]Reduced balance/proprioceptive control   []other:      Functional Activity Limitations (from functional questionnaire and intake)   [x]Reduced ability to tolerate prolonged functional positions   [x]Reduced ability or difficulty with changes of positions or transfers between positions   [x]Reduced ability to maintain good posture and demonstrate good body mechanics with sitting, bending, and lifting   [x]Reduced ability to sleep   [x] Reduced ability or tolerance with driving and/or computer work   [x]Reduced ability to perform lifting, carrying tasks   [x]Reduced ability to squat   [x]Reduced ability to forward bend   [x]Reduced ability to ambulate prolonged functional periods/distances/surfaces   [x]Reduced ability to ascend/descend stairs   [x]Reduced ability to run, hop or jump    Participation Restrictions   [x]Reduced participation in self care activities   [x]Reduced participation in home management activities   [x]Reduced participation in work activities   [x]Reduced participation in social activities. [x]Reduced participation in sport activities.     Classification :    []Signs/symptoms consistent with post-surgical status including decreased ROM, strength and function. []Signs/symptoms consistent with joint sprain/strain   []Signs/symptoms consistent with patella-femoral syndrome   []Signs/symptoms consistent with knee OA/hip OA   []Signs/symptoms consistent with internal derangement of knee/Hip   []Signs/symptoms consistent with functional hip weakness/NMR control      [x]Signs/symptoms consistent with tendinitis/tendinosis    []signs/symptoms consistent with pathology which may benefit from Dry needling      []other:    Classification :       - ligament or other connective tissue dysfunction. (Pattern 4D)  - localized inflammation. (Pattern 4E)        Prognosis/Rehab Potential:       - Good        Factors affecting rehab potential:  - associated co-morbidities    Tolerance of evaluation/treatment:     - Good       Physical Therapy Evaluation Complexity Justification  [x] A history of present problem with:  [x] no personal factors and/or comorbidities that impact the plan of care;  []1-2 personal factors and/or comorbidities that impact the plan of care  []3 personal factors and/or comorbidities that impact the plan of care  [x] An examination of body systems using standardized tests and measures addressing any of the following: body structures and functions (impairments), activity limitations, and/or participation restrictions;:  [x] a total of 1-2 or more elements   [] a total of 3 or more elements   [] a total of 4 or more elements   [x] A clinical presentation with:  [] stable and/or uncomplicated characteristics   [x] evolving clinical presentation with changing characteristics  [] unstable and unpredictable characteristics;   [x] Clinical decision making of [x] low, [] moderate, [] high complexity using standardized patient assessment instrument and/or measurable assessment of functional outcome.     [x] EVAL (LOW) 05824 (typically 30 minutes face-to-face)  [] EVAL (MOD) 49614 (typically 30 minutes face-to-face)  [] EVAL (HIGH) 55370 (typically 45 minutes face-to-face)  [] RE-EVAL         Co-morbidities/Complexities (which will affect course of rehabilitation):   []None           Arthritic conditions   []Rheumatoid arthritis (M05.9)  []Osteoarthritis (M19.91)   Cardiovascular conditions   []Hypertension (I10)  []Hyperlipidemia (E78.5)  []Angina pectoris (I20)  []Atherosclerosis (I70)   Musculoskeletal conditions   []Disc pathology   []Congenital spine pathologies   []Prior surgical intervention  []Osteoporosis (M81.8)  []Osteopenia (M85.8)   Endocrine conditions   []Hypothyroid (E03.9)  []Hyperthyroid Gastrointestinal conditions   []Constipation (P14.12)   Metabolic conditions   []Morbid obesity (E66.01)  []Diabetes type 1(E10.65) or 2 (E11.65)   []Neuropathy (G60.9)     Pulmonary conditions   []Asthma (J45)  []Coughing   []COPD (J44.9)   Psychological Disorders  []Anxiety (F41.9)  []Depression (F32.9)   []Other:   []Other:          PLAN  Frequency/Duration:  2 days per week for 8 Weeks:  Interventions:  - Therapeutic exercise including: strength training, ROM/flexibility, NMR and proprioception for the proximal hip, trunk and Lower extremity  - Manual therapy as indicated including Dry Needling/IASTM, STM, PROM, Gr I-IV mobilizations, spinal mobilization/manipulation.   - Modalities as needed including: thermal agents, E-stim, US, iontophoresis as indicated. - Patient education on joint protection, activity modification, progression of HEP. HEP instruction: see flowsheet     GOALS:  Patient stated goal: \"to avoid surgery\"    Therapist goals for Patient:   Short Term Goals: To be achieved in: 2 weeks  - Independent in HEP and progression per patient tolerance, in order to prevent re-injury. - Patient will have a decrease in pain to facilitate improvement in movement, function, and ADLs as indicated by Functional Deficits. Long Term Goals:  To be achieved in: 6 weeks  - The patient is expected to demonstrate less than 20% impairment, limitation or restriction in:  - walking and moving around (mobility)    - Patient will demonstrate increased passive and active ROM to full, symmetrical and painfree to allow for proper joint functioning as indicated by patients Functional Deficits. - Patient will demonstrate an increase in Strength to full and painfree to resistance testing to allow for proper functional mobility as indicated by patients Functional Deficits. - Patient will return to desired, higher level,  functional activities without increased symptoms or restriction.       Electronically signed by:  Kenneth Treadwell PT, MPT

## 2023-03-05 NOTE — FLOWSHEET NOTE
[facility] and [address],  Sports Performance and Rehabilitation, [facility] [address]  [address] [place] [address],[address]   Rhonda Contreras  Phone: [phone]  Fax: [phone]     Physical Therapy Treatment Note/ Progress Report:           Date:  3/1/2023    Patient Name:  Christine Mooney    :  1985  MRN: [id number]  Restrictions/Precautions:    Medical/Treatment Diagnosis Information:   M25.552 - left hip pain      Insurance/Certification information:     Physician Information:     Has the plan of care been signed (Y/N):        []  Yes  [x]  No     Date of Patient follow up with Physician:       Is this a Progress Report:     []  Yes  [x]  No        If Yes:  Date Range for reporting period:  Beginning  Ending    Progress report will be due (10 Rx or 30 days whichever is less):        Recertification will be due (POC Duration  / 90 days whichever is less):          Visit # Insurance Allowable Auth Required   1  []  Yes []  No        Functional Scale:     Date assessed:        Latex Allergy:  [x]NO      []YES  Preferred Language for Healthcare:   [x]English       []other:      Pain level:  7/10     SUBJECTIVE:  See eval    OBJECTIVE: See eval  Observation:   Test measurements:      RESTRICTIONS/PRECAUTIONS:     Exercises/Interventions:       Therapeutic Ex (13724) Sets/sec Reps Notes/CUES   Child pose   8 x 10\"      Eob hip flexor belt stretch   4 x 20\"    Charlann Every stretch   3 x 20\"    Standing 3D eob hip flexor stretch matrix   20x each    Lsd   4\" - 20x    Sidelying gluteal progression (2 positions)  20x each    Single leg bridge   20x    Mini squat on bosu  20x    Prone over ball: B hip ext's   20x                      Manual Intervention (22366)      Prom/stm   15'                                                                                              Therapeutic Activity (27764)                                              Therapeutic Exercise and NMR EXR  [x] (27452) Provided verbal/tactile cueing for activities related to strengthening, flexibility, endurance, ROM for improvements in LE, proximal hip, and core control with self care, mobility, lifting, ambulation. [x] (66008) Provided verbal/tactile cueing for activities related to improving balance, coordination, kinesthetic sense, posture, motor skill, proprioception to assist with LE, proximal hip, and core control in self-care, mobility, lifting, ambulation and eccentric single leg control.      NMR and Therapeutic Activities:    [x] (47024 or 22237) Provided verbal/tactile cueing for activities related to improving balance, coordination, kinesthetic sense, posture, motor skill, proprioception and motor activation to allow for proper function of core, proximal hip and LE with self-care and ADLs and functional mobility.   [] (54395) Gait Re-education- Provided training and instruction to the patient for proper LE, core and proximal hip recruitment and positioning and eccentric body weight control with ambulation re-education including up and down stairs     Home Exercise Program:    [x] (17401) Reviewed/Progressed HEP activities related to strengthening, flexibility, endurance, ROM of core, proximal hip and LE for functional self-care, mobility, lifting and ambulation/stair navigation   [] (63327) Reviewed/Progressed HEP activities related to improving balance, coordination, kinesthetic sense, posture, motor skill, proprioception of core, proximal hip and LE for self-care, mobility, lifting, and ambulation/stair navigation      Manual Treatments:  PROM / STM / Oscillations-Mobs:  G-I, II, III, IV (PA's, Inf., Post.)  [x] (17807) Provided manual therapy to mobilize LE, proximal hip and/or LS spine soft tissue/joints for the purpose of modulating pain, promoting relaxation, increasing ROM, reducing/eliminating soft tissue swelling/inflammation/restriction, improving soft tissue extensibility and allowing for proper ROM for normal function with self-care, mobility, lifting and ambulation. Modalities:     [] GAME READY (VASO)- for significant edema, swelling, pain control. Charges:  Timed Code Treatment Minutes: 35'   Total Treatment Minutes: 72'      [x] EVAL (LOW) 01612 (typically 20 minutes face-to-face)  [] EVAL (MOD) 30193 (typically 30 minutes face-to-face)  [] EVAL (HIGH) 88494 (typically 45 minutes face-to-face)  [] RE-EVAL     [] SY(41313) x     [] IONTO  [] NMR (93591) x     [] VASO  [] Manual (38536) x     [] Other:  [] TA x      [] Mech Traction (89690)  [] ES(attended) (50883)      [] ES (un) (03358):         ASSESSMENT:  See eval      GOALS:   Patient stated goal:     [] Progressing: [] Met: [] Not Met: [] Adjusted    Therapist goals for Patient:   Short Term Goals: To be achieved in: 2 weeks  1. Independent in HEP and progression per patient tolerance, in order to prevent re-injury. [] Progressing: [] Met: [] Not Met: [] Adjusted   2. Patient will have a decrease in pain to facilitate improvement in movement, function, and ADLs as indicated by Functional Deficits. [] Progressing: [] Met: [] Not Met: [] Adjusted    Long Term Goals: To be achieved in:    6 weeks  - The patient is expected to demonstrate less than 20% impairment, limitation or restriction in:  - walking and moving around (mobility)     - Patient will demonstrate increased passive and active ROM to full, symmetrical and painfree to allow for proper joint functioning as indicated by patients Functional Deficits. [] Progressing: [] Met: [] Not Met: [] Adjusted  - Patient will demonstrate an increase in Strength to full and painfree to resistance testing to allow for proper functional mobility as indicated by patients Functional Deficits. [] Progressing: [] Met: [] Not Met: [] Adjusted  - Patient will return to desired, higher level,  functional activities without increased symptoms or restriction.            [] Progressing: [] Met: [] Not Met: [] Adjusted                Overall Progression Towards Functional goals/ Treatment Progress Update:  [] Patient is progressing as expected towards functional goals listed. [] Progression is slowed due to complexities/Impairments listed. [] Progression has been slowed due to co-morbidities. [x] Plan just implemented, too soon to assess goals progression <30days   [] Goals require adjustment due to lack of progress  [] Patient is not progressing as expected and requires additional follow up with physician  [] Other    Prognosis for POC: [x] Good [] Fair  [] Poor      Patient requires continued skilled intervention: [x] Yes  [] No    Treatment/Activity Tolerance:  [x] Patient able to complete treatment  [] Patient limited by fatigue  [] Patient limited by pain    [] Patient limited by other medical complications  [] Other:         Return to Play: (if applicable)   []  Stage 1: Intro to Strength   []  Stage 2: Return to Run and Strength   []  Stage 3: Return to Jump and Strength   []  Stage 4: Dynamic Strength and Agility   []  Stage 5: Sport Specific Training     []  Ready to Return to Play, Meets All Above Stages   []  Not Ready for Return to Sports   Comments:                               PLAN: See eval  [] Continue per plan of care [] Alter current plan (see comments above)  [x] Plan of care initiated [] Hold pending MD visit [] Discharge      Electronically signed by:  Karen Kelly, PT, MPT     Note: If patient does not return for scheduled/ recommended follow up visits, this note will serve as a discharge from care along with most recent update on progress.

## 2023-08-24 LAB
CHOLEST SERPL-MCNC: 175 MG/DL (ref 0–199)
GLUCOSE SERPL-MCNC: 87 MG/DL (ref 70–99)
HDLC SERPL-MCNC: 80 MG/DL (ref 40–60)
LDLC SERPL CALC-MCNC: 86 MG/DL
TRIGL SERPL-MCNC: 47 MG/DL (ref 0–150)

## 2024-02-13 ENCOUNTER — OFFICE VISIT (OUTPATIENT)
Dept: INTERNAL MEDICINE CLINIC | Age: 39
End: 2024-02-13
Payer: COMMERCIAL

## 2024-02-13 VITALS
HEIGHT: 66 IN | BODY MASS INDEX: 20.89 KG/M2 | SYSTOLIC BLOOD PRESSURE: 122 MMHG | WEIGHT: 130 LBS | DIASTOLIC BLOOD PRESSURE: 68 MMHG

## 2024-02-13 DIAGNOSIS — Z00.00 WELL ADULT EXAM: Primary | ICD-10-CM

## 2024-02-13 PROCEDURE — 99395 PREV VISIT EST AGE 18-39: CPT | Performed by: INTERNAL MEDICINE

## 2024-02-13 RX ORDER — PENICILLIN V POTASSIUM 500 MG/1
500 TABLET ORAL 2 TIMES DAILY
COMMUNITY
Start: 2024-02-06

## 2024-02-13 NOTE — PROGRESS NOTES
2024    Liliana Coppola (:  1985) marta 38 y.o. female, here for a preventive medicine evaluation.    There is no problem list on file for this patient.    No significant issues  Exercising regularly  Just diagnosed with strep - completing amtibiotics  Last Tdap about 8 yrs ago    Review of Systems   Constitutional:  Negative for fatigue and unexpected weight change.   HENT:  Negative for ear pain and sinus pain.    Eyes:  Negative for visual disturbance.   Respiratory:  Negative for cough and shortness of breath.    Cardiovascular:  Negative for chest pain and leg swelling.   Gastrointestinal:  Negative for constipation and diarrhea.   Genitourinary:  Negative for difficulty urinating and dysuria.   Musculoskeletal:  Negative for arthralgias and back pain.   Skin:  Negative for rash.   Neurological:  Negative for weakness and numbness.   Psychiatric/Behavioral:  Negative for dysphoric mood. The patient is not nervous/anxious.         Prior to Visit Medications    Medication Sig Taking? Authorizing Provider   penicillin v potassium (VEETID) 500 MG tablet Take 1 tablet by mouth in the morning and at bedtime 10 days Yes Provider, MD Joseph        Allergies   Allergen Reactions    Oxycodone        Past Medical History:   Diagnosis Date    GERD (gastroesophageal reflux disease) stomach pain    diarrhea    Headache        Past Surgical History:   Procedure Laterality Date    HYSTEROSCOPY         Social History     Socioeconomic History    Marital status:      Spouse name: Not on file    Number of children: Not on file    Years of education: Not on file    Highest education level: Not on file   Occupational History    Not on file   Tobacco Use    Smoking status: Never    Smokeless tobacco: Never   Substance and Sexual Activity    Alcohol use: Yes     Alcohol/week: 4.0 standard drinks of alcohol     Types: 4 Glasses of wine per week     Comment: socially    Drug use: Never    Sexual activity: Yes

## 2024-03-04 ENCOUNTER — OFFICE VISIT (OUTPATIENT)
Dept: FAMILY MEDICINE CLINIC | Age: 39
End: 2024-03-04
Payer: COMMERCIAL

## 2024-03-04 VITALS
HEART RATE: 103 BPM | BODY MASS INDEX: 20.43 KG/M2 | SYSTOLIC BLOOD PRESSURE: 108 MMHG | OXYGEN SATURATION: 98 % | DIASTOLIC BLOOD PRESSURE: 74 MMHG | WEIGHT: 126.6 LBS

## 2024-03-04 DIAGNOSIS — B97.89 SORE THROAT (VIRAL): Primary | ICD-10-CM

## 2024-03-04 DIAGNOSIS — J02.8 SORE THROAT (VIRAL): Primary | ICD-10-CM

## 2024-03-04 DIAGNOSIS — B97.89 SORE THROAT (VIRAL): ICD-10-CM

## 2024-03-04 DIAGNOSIS — J02.8 SORE THROAT (VIRAL): ICD-10-CM

## 2024-03-04 PROCEDURE — 99203 OFFICE O/P NEW LOW 30 MIN: CPT | Performed by: STUDENT IN AN ORGANIZED HEALTH CARE EDUCATION/TRAINING PROGRAM

## 2024-03-04 RX ORDER — NAPROXEN 250 MG/1
250 TABLET ORAL 2 TIMES DAILY WITH MEALS
Qty: 180 TABLET | Refills: 1 | Status: SHIPPED | OUTPATIENT
Start: 2024-03-04 | End: 2024-03-04

## 2024-03-04 RX ORDER — NAPROXEN 250 MG/1
250 TABLET ORAL 2 TIMES DAILY PRN
Qty: 180 TABLET | Refills: 1 | Status: SHIPPED | OUTPATIENT
Start: 2024-03-04

## 2024-03-04 RX ORDER — ACETAMINOPHEN 500 MG
500 TABLET ORAL 4 TIMES DAILY PRN
Qty: 120 TABLET | Refills: 5 | Status: SHIPPED | OUTPATIENT
Start: 2024-03-04

## 2024-03-04 SDOH — ECONOMIC STABILITY: FOOD INSECURITY: WITHIN THE PAST 12 MONTHS, THE FOOD YOU BOUGHT JUST DIDN'T LAST AND YOU DIDN'T HAVE MONEY TO GET MORE.: NEVER TRUE

## 2024-03-04 SDOH — ECONOMIC STABILITY: FOOD INSECURITY: WITHIN THE PAST 12 MONTHS, YOU WORRIED THAT YOUR FOOD WOULD RUN OUT BEFORE YOU GOT MONEY TO BUY MORE.: NEVER TRUE

## 2024-03-04 SDOH — ECONOMIC STABILITY: INCOME INSECURITY: HOW HARD IS IT FOR YOU TO PAY FOR THE VERY BASICS LIKE FOOD, HOUSING, MEDICAL CARE, AND HEATING?: NOT VERY HARD

## 2024-03-04 NOTE — PROGRESS NOTES
Chief Complaint   Patient presents with    Pharyngitis     Pt states she has a real bad sore throat. Tested negative for COVID, Flu and Strep.           HPI: Liliana Coppola is a 38 y.o. female who presents for Sore throat    #Sore throat  -Patient states she went to an urgent care approximately 4 days ago after developing a sore throat overnight, was tested for strep, COVID and flu which all came back negative, states she still has sore throat/swollen lymph nodes, would like retested for strep  -Denies fever, nausea or vomiting, does have fatigue and joint pain, denies cough but does endorse some nasal congestion   - Taking Ibuprofen and Tylenol but maybe 400 mg in divided doses, helps some  -    Past Medical History:   Diagnosis Date    GERD (gastroesophageal reflux disease) stomach pain    diarrhea    Headache        Past Surgical History:   Procedure Laterality Date    HYSTEROSCOPY        Current Outpatient Medications   Medication Sig Dispense Refill    acetaminophen (TYLENOL) 500 MG tablet Take 1 tablet by mouth 4 times daily as needed for Pain 120 tablet 5    naproxen (NAPROSYN) 250 MG tablet Take 1 tablet by mouth 2 times daily as needed for Pain 180 tablet 1    penicillin v potassium (VEETID) 500 MG tablet Take 1 tablet by mouth in the morning and at bedtime 10 days (Patient not taking: Reported on 3/4/2024)       No current facility-administered medications for this visit.      Family History   Problem Relation Age of Onset    Thyroid Disease Mother         hypothyroidism    Seizures Sister     Diabetes Maternal Grandmother     Heart Disease Maternal Grandfather     Heart Surgery Maternal Grandfather     Pancreatic Cancer Paternal Grandfather     Breast Cancer Paternal Aunt     Thyroid Disease Maternal Aunt         hyperthyroidism      Allergies   Allergen Reactions    Oxycodone       Social History     Socioeconomic History    Marital status:      Spouse name: None    Number of children: None

## 2024-03-05 LAB — HETEROPH AB BLD QL IA: NEGATIVE

## 2025-01-25 SDOH — HEALTH STABILITY: PHYSICAL HEALTH: ON AVERAGE, HOW MANY DAYS PER WEEK DO YOU ENGAGE IN MODERATE TO STRENUOUS EXERCISE (LIKE A BRISK WALK)?: 4 DAYS

## 2025-01-25 SDOH — HEALTH STABILITY: PHYSICAL HEALTH: ON AVERAGE, HOW MANY MINUTES DO YOU ENGAGE IN EXERCISE AT THIS LEVEL?: 30 MIN

## 2025-01-28 ENCOUNTER — OFFICE VISIT (OUTPATIENT)
Dept: INTERNAL MEDICINE CLINIC | Age: 40
End: 2025-01-28

## 2025-01-28 VITALS
SYSTOLIC BLOOD PRESSURE: 110 MMHG | DIASTOLIC BLOOD PRESSURE: 70 MMHG | HEIGHT: 66 IN | BODY MASS INDEX: 21.38 KG/M2 | WEIGHT: 133 LBS

## 2025-01-28 DIAGNOSIS — N92.6 IRREGULAR PERIODS: ICD-10-CM

## 2025-01-28 DIAGNOSIS — Z12.4 SCREENING FOR CERVICAL CANCER: ICD-10-CM

## 2025-01-28 DIAGNOSIS — Z00.00 ANNUAL PHYSICAL EXAM: Primary | ICD-10-CM

## 2025-01-28 DIAGNOSIS — Z23 NEED FOR TDAP VACCINATION: ICD-10-CM

## 2025-01-28 DIAGNOSIS — M25.552 PAIN OF LEFT HIP: ICD-10-CM

## 2025-01-28 SDOH — ECONOMIC STABILITY: FOOD INSECURITY: WITHIN THE PAST 12 MONTHS, YOU WORRIED THAT YOUR FOOD WOULD RUN OUT BEFORE YOU GOT MONEY TO BUY MORE.: NEVER TRUE

## 2025-01-28 SDOH — ECONOMIC STABILITY: FOOD INSECURITY: WITHIN THE PAST 12 MONTHS, THE FOOD YOU BOUGHT JUST DIDN'T LAST AND YOU DIDN'T HAVE MONEY TO GET MORE.: NEVER TRUE

## 2025-01-28 ASSESSMENT — PATIENT HEALTH QUESTIONNAIRE - PHQ9
SUM OF ALL RESPONSES TO PHQ QUESTIONS 1-9: 0
SUM OF ALL RESPONSES TO PHQ9 QUESTIONS 1 & 2: 0
2. FEELING DOWN, DEPRESSED OR HOPELESS: NOT AT ALL
1. LITTLE INTEREST OR PLEASURE IN DOING THINGS: NOT AT ALL
SUM OF ALL RESPONSES TO PHQ QUESTIONS 1-9: 0

## 2025-01-28 NOTE — ASSESSMENT & PLAN NOTE
Cycle length has become shorter and shorter over the last few months.  Otherwise periods have not changed.  She also feels that she has been having hot flashes.  - Referred to gynecology  - Will check basic lab work

## 2025-01-28 NOTE — PROGRESS NOTES
2025    Liliana Coppola (:  1985) is a 39 y.o. female, here for evaluation of the following medical concerns:    Chief Complaint   Patient presents with    New Patient        HPI    Patient is here to establish care. Prior patient of Dr. Araujo.     She is concerned she has been going through perimenopause over the last few months. Her periods have become very irregular, are now much closer together (3 in the last 7 weeks). She feels her hormones are 'weird.' She has been having hot flashes.      Prior to Visit Medications    Not on File        Allergies   Allergen Reactions    Oxycodone        Past Medical History:   Diagnosis Date    GERD (gastroesophageal reflux disease) stomach pain    diarrhea    Headache        Past Surgical History:   Procedure Laterality Date    HYSTEROSCOPY         Social History     Socioeconomic History    Marital status:      Spouse name: Not on file    Number of children: Not on file    Years of education: Not on file    Highest education level: Not on file   Occupational History    Not on file   Tobacco Use    Smoking status: Never    Smokeless tobacco: Never   Substance and Sexual Activity    Alcohol use: Yes     Alcohol/week: 4.0 standard drinks of alcohol     Types: 4 Glasses of wine per week     Comment: socially    Drug use: Never    Sexual activity: Yes     Partners: Male   Other Topics Concern    Not on file   Social History Narrative    Not on file     Social Determinants of Health     Financial Resource Strain: Low Risk  (3/4/2024)    Overall Financial Resource Strain (CARDIA)     Difficulty of Paying Living Expenses: Not very hard   Food Insecurity: No Food Insecurity (2025)    Hunger Vital Sign     Worried About Running Out of Food in the Last Year: Never true     Ran Out of Food in the Last Year: Never true   Transportation Needs: No Transportation Needs (2025)    PRAPARE - Transportation     Lack of Transportation (Medical): No     Lack of

## 2025-01-28 NOTE — ASSESSMENT & PLAN NOTE
- referred to gyn for PAP/routine care  - dentist UTD  - no eye dr- recommend scheduling  - routine labs ordered  - healthy diet and exercise discussed  - outstanding immunizations discussed- tdap due

## 2025-01-29 DIAGNOSIS — Z00.00 ANNUAL PHYSICAL EXAM: ICD-10-CM

## 2025-01-29 LAB
25(OH)D3 SERPL-MCNC: 28.3 NG/ML
ALBUMIN SERPL-MCNC: 4.3 G/DL (ref 3.4–5)
ALBUMIN/GLOB SERPL: 1.6 {RATIO} (ref 1.1–2.2)
ALP SERPL-CCNC: 44 U/L (ref 40–129)
ALT SERPL-CCNC: 19 U/L (ref 10–40)
ANION GAP SERPL CALCULATED.3IONS-SCNC: 10 MMOL/L (ref 3–16)
AST SERPL-CCNC: 23 U/L (ref 15–37)
BACTERIA URNS QL MICRO: ABNORMAL /HPF
BASOPHILS # BLD: 0.1 K/UL (ref 0–0.2)
BASOPHILS NFR BLD: 1.2 %
BILIRUB SERPL-MCNC: 0.3 MG/DL (ref 0–1)
BILIRUB UR QL STRIP.AUTO: NEGATIVE
BUN SERPL-MCNC: 12 MG/DL (ref 7–20)
CALCIUM SERPL-MCNC: 9.5 MG/DL (ref 8.3–10.6)
CHLORIDE SERPL-SCNC: 104 MMOL/L (ref 99–110)
CHOLEST SERPL-MCNC: 224 MG/DL (ref 0–199)
CLARITY UR: ABNORMAL
CO2 SERPL-SCNC: 27 MMOL/L (ref 21–32)
COLOR UR: YELLOW
CREAT SERPL-MCNC: 0.8 MG/DL (ref 0.6–1.1)
DEPRECATED RDW RBC AUTO: 15.9 % (ref 12.4–15.4)
EOSINOPHIL # BLD: 0.1 K/UL (ref 0–0.6)
EOSINOPHIL NFR BLD: 2.6 %
EPI CELLS #/AREA URNS AUTO: 25 /HPF (ref 0–5)
GFR SERPLBLD CREATININE-BSD FMLA CKD-EPI: >90 ML/MIN/{1.73_M2}
GLUCOSE SERPL-MCNC: 85 MG/DL (ref 70–99)
GLUCOSE UR STRIP.AUTO-MCNC: NEGATIVE MG/DL
HCT VFR BLD AUTO: 40.8 % (ref 36–48)
HDLC SERPL-MCNC: 87 MG/DL (ref 40–60)
HGB BLD-MCNC: 13.4 G/DL (ref 12–16)
HGB UR QL STRIP.AUTO: NEGATIVE
HYALINE CASTS #/AREA URNS LPF: ABNORMAL /LPF (ref 0–2)
KETONES UR STRIP.AUTO-MCNC: NEGATIVE MG/DL
LDLC SERPL CALC-MCNC: 122 MG/DL
LEUKOCYTE ESTERASE UR QL STRIP.AUTO: ABNORMAL
LYMPHOCYTES # BLD: 1.1 K/UL (ref 1–5.1)
LYMPHOCYTES NFR BLD: 25.4 %
MCH RBC QN AUTO: 27.8 PG (ref 26–34)
MCHC RBC AUTO-ENTMCNC: 32.9 G/DL (ref 31–36)
MCV RBC AUTO: 84.5 FL (ref 80–100)
MONOCYTES # BLD: 0.4 K/UL (ref 0–1.3)
MONOCYTES NFR BLD: 9.6 %
MUCUS: PRESENT
NEUTROPHILS # BLD: 2.7 K/UL (ref 1.7–7.7)
NEUTROPHILS NFR BLD: 61.2 %
NITRITE UR QL STRIP.AUTO: NEGATIVE
PH UR STRIP.AUTO: 6.5 [PH] (ref 5–8)
PLATELET # BLD AUTO: 265 K/UL (ref 135–450)
PMV BLD AUTO: 8.3 FL (ref 5–10.5)
POTASSIUM SERPL-SCNC: 4 MMOL/L (ref 3.5–5.1)
PROT SERPL-MCNC: 7 G/DL (ref 6.4–8.2)
PROT UR STRIP.AUTO-MCNC: 30 MG/DL
RBC # BLD AUTO: 4.83 M/UL (ref 4–5.2)
RBC CLUMPS #/AREA URNS AUTO: 1 /HPF (ref 0–4)
SODIUM SERPL-SCNC: 141 MMOL/L (ref 136–145)
SP GR UR STRIP.AUTO: 1.02 (ref 1–1.03)
TRIGL SERPL-MCNC: 76 MG/DL (ref 0–150)
TSH SERPL DL<=0.005 MIU/L-ACNC: 3.05 UIU/ML (ref 0.27–4.2)
UA DIPSTICK W REFLEX MICRO PNL UR: YES
URN SPEC COLLECT METH UR: ABNORMAL
UROBILINOGEN UR STRIP-ACNC: 0.2 E.U./DL
VLDLC SERPL CALC-MCNC: 15 MG/DL
WBC # BLD AUTO: 4.4 K/UL (ref 4–11)
WBC #/AREA URNS HPF: ABNORMAL /HPF (ref 0–5)

## 2025-01-30 ENCOUNTER — PATIENT MESSAGE (OUTPATIENT)
Dept: INTERNAL MEDICINE CLINIC | Age: 40
End: 2025-01-30

## 2025-01-30 DIAGNOSIS — R82.90 ABNORMAL URINE: Primary | ICD-10-CM

## 2025-01-30 LAB
EST. AVERAGE GLUCOSE BLD GHB EST-MCNC: 85.3 MG/DL
HBA1C MFR BLD: 4.6 %

## 2025-01-31 DIAGNOSIS — R82.90 ABNORMAL URINE: Primary | ICD-10-CM

## 2025-02-14 ENCOUNTER — OFFICE VISIT (OUTPATIENT)
Dept: ORTHOPEDIC SURGERY | Age: 40
End: 2025-02-14
Payer: COMMERCIAL

## 2025-02-14 ENCOUNTER — TELEPHONE (OUTPATIENT)
Dept: ORTHOPEDIC SURGERY | Age: 40
End: 2025-02-14

## 2025-02-14 VITALS — BODY MASS INDEX: 21.38 KG/M2 | HEIGHT: 66 IN | WEIGHT: 133 LBS

## 2025-02-14 DIAGNOSIS — M25.852 IMPINGEMENT SYNDROME, HIP, LEFT: Primary | ICD-10-CM

## 2025-02-14 PROCEDURE — 99213 OFFICE O/P EST LOW 20 MIN: CPT | Performed by: ORTHOPAEDIC SURGERY

## 2025-02-14 NOTE — PROGRESS NOTES
impingement sign present.    Imaging    Left Hip: [facility]  Previous Radiographs: X-rays were ordered today and reviewed of the left hip.  3 views.  AP pelvis, lateral, and false profile.  They demonstrate no evidence of fractures or dislocations.  There is an indention due to hypertrophy of the iliocapsularis muscle.  Patient does have a positive ischial spine sign for mild retroversion of the pelvis.  Tiny cam lesion present.    The cam lesion seen on x-rays may be more of an attachment of the iliocapsularis muscle.  The retroversion is mild.      Procedure:  Orders Placed This Encounter   Procedures    XR HIP LEFT (2-3 VIEWS)     Standing Status:   Future     Number of Occurrences:   1     Standing Expiration Date:   2/14/2026           Assessment and Plan  Liliana was seen today for hip pain.    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  Patient also has a labral tear.  She has done multiple sessions of physical therapy and continues to do home exercises.  I will order her a new MRI to fully evaluate for OA, impingement, and labral tear.  Her previous MRI is almost 3 years old.    I discussed with Liliana Coppola that her history, symptoms, signs, and imaging are most consistent with labral tear and femoro-acetabular impingement    We reviewed the natural history of these conditions and treatment options ranging from conservative measures (rest, icing, activity modification, physical therapy, pain meds, cortisone injection) to surgical options.     In terms of treatment, I recommended continuing with rest, icing, avoidance of painful activities, NSAIDs or pain meds as tolerated, and physical therapy.       We discussed surgical options as well, should conservative measures fail.

## 2025-02-24 ENCOUNTER — PATIENT MESSAGE (OUTPATIENT)
Dept: INTERNAL MEDICINE CLINIC | Age: 40
End: 2025-02-24

## 2025-02-27 PROBLEM — Z00.00 ANNUAL PHYSICAL EXAM: Status: RESOLVED | Noted: 2025-01-28 | Resolved: 2025-02-27

## 2025-03-03 DIAGNOSIS — R82.90 ABNORMAL URINE: ICD-10-CM

## 2025-03-04 LAB
BACTERIA URNS QL MICRO: ABNORMAL /HPF
BILIRUB UR QL STRIP.AUTO: NEGATIVE
CLARITY UR: CLEAR
COLOR UR: YELLOW
CREAT UR-MCNC: 49.6 MG/DL (ref 28–259)
EPI CELLS #/AREA URNS AUTO: 7 /HPF (ref 0–5)
GLUCOSE UR STRIP.AUTO-MCNC: NEGATIVE MG/DL
HGB UR QL STRIP.AUTO: NEGATIVE
HYALINE CASTS #/AREA URNS AUTO: 0 /LPF (ref 0–8)
KETONES UR STRIP.AUTO-MCNC: NEGATIVE MG/DL
LEUKOCYTE ESTERASE UR QL STRIP.AUTO: ABNORMAL
MICROALBUMIN UR DL<=1MG/L-MCNC: <1.2 MG/DL
MICROALBUMIN/CREAT UR: NORMAL MG/G (ref 0–30)
NITRITE UR QL STRIP.AUTO: NEGATIVE
PH UR STRIP.AUTO: 6.5 [PH] (ref 5–8)
PROT UR STRIP.AUTO-MCNC: NEGATIVE MG/DL
RBC CLUMPS #/AREA URNS AUTO: 0 /HPF (ref 0–4)
SP GR UR STRIP.AUTO: 1.01 (ref 1–1.03)
UA DIPSTICK W REFLEX MICRO PNL UR: YES
URN SPEC COLLECT METH UR: ABNORMAL
UROBILINOGEN UR STRIP-ACNC: 0.2 E.U./DL
WBC #/AREA URNS AUTO: 2 /HPF (ref 0–5)

## 2025-03-07 ENCOUNTER — OFFICE VISIT (OUTPATIENT)
Dept: ORTHOPEDIC SURGERY | Age: 40
End: 2025-03-07
Payer: COMMERCIAL

## 2025-03-07 VITALS — HEIGHT: 66 IN | WEIGHT: 133 LBS | BODY MASS INDEX: 21.38 KG/M2

## 2025-03-07 DIAGNOSIS — M24.852 LEFT SNAPPING HIP: Primary | ICD-10-CM

## 2025-03-07 PROCEDURE — 99213 OFFICE O/P EST LOW 20 MIN: CPT | Performed by: ORTHOPAEDIC SURGERY

## 2025-04-01 ENCOUNTER — HOSPITAL ENCOUNTER (OUTPATIENT)
Dept: PHYSICAL THERAPY | Age: 40
Setting detail: THERAPIES SERIES
Discharge: HOME | End: 2025-04-01
Attending: ORTHOPAEDIC SURGERY
Payer: COMMERCIAL

## 2025-04-01 DIAGNOSIS — R29.898 DECREASED STRENGTH OF LOWER EXTREMITY: ICD-10-CM

## 2025-04-01 DIAGNOSIS — M25.552 LEFT HIP PAIN: Primary | ICD-10-CM

## 2025-04-01 DIAGNOSIS — M25.652 DECREASED RANGE OF LEFT HIP MOVEMENT: ICD-10-CM

## 2025-04-01 PROCEDURE — 97161 PT EVAL LOW COMPLEX 20 MIN: CPT | Performed by: PHYSICAL THERAPIST

## 2025-04-01 PROCEDURE — 97110 THERAPEUTIC EXERCISES: CPT | Performed by: PHYSICAL THERAPIST

## 2025-04-01 NOTE — PLAN OF CARE
[facility] - Outpatient Rehabilitation and Therapy: [address], [address], [place] [address] office: [phone] fax: [phone]     Physical Therapy Initial Evaluation Certification      Dear Fermín Hernandez MD ,    We had the pleasure of evaluating the following patient for physical therapy services at [facility] Outpatient Physical Therapy.  A summary of our findings can be found in the initial assessment below.  This includes our plan of care.  If you have any questions or concerns regarding these findings, please do not hesitate to contact me at the office phone number listed above.  Thank you for the referral.     Physician Signature:_______________________________Date:__________________  By signing above (or electronic signature), therapist’s plan is approved by physician       Physical Therapy: TREATMENT/PROGRESS NOTE   Patient: Liliana Coppola (39 y.o. female)   Examination Date: 2025   :  1985 MRN: [id number]   Visit #: 1   Insurance Allowable Auth Needed   mn []Yes    [x]No    Insurance: Payor: [facility] / Plan: [facility] EMPLOYEES / Product Type: *No Product type* /   Insurance ID: [id number] - (Commercial)  Secondary Insurance (if applicable):    Treatment Diagnosis:     ICD-10-CM    1. Left hip pain  M25.552       2. Decreased range of left hip movement  M25.652       3. Decreased strength of lower extremity  R29.898          Medical Diagnosis:  Left snapping hip [M24.852]   Referring Physician: Fermín Hernandez MD  PCP: Rena Agarwal MD     Plan of care signed (Y/N):     Date of Patient follow up with Physician:      Plan of Care Report: EVAL today  POC update due: (10 visits /OR AUTH LIMITS, whichever is less)  2025                                             Medical History:  Comorbidities:  None  Relevant Medical History: unremarkable                                         Precautions/ Contra-indications:           Latex allergy:  NO  Pacemaker:

## 2025-04-07 ENCOUNTER — HOSPITAL ENCOUNTER (OUTPATIENT)
Dept: PHYSICAL THERAPY | Age: 40
Setting detail: THERAPIES SERIES
Discharge: HOME | End: 2025-04-07
Attending: ORTHOPAEDIC SURGERY
Payer: COMMERCIAL

## 2025-04-07 PROCEDURE — 97110 THERAPEUTIC EXERCISES: CPT | Performed by: PHYSICAL THERAPIST

## 2025-04-07 NOTE — FLOWSHEET NOTE
[facility] - Outpatient Rehabilitation and Therapy: [address], [place] [address] office: [phone] fax: [phone]         Physical Therapy: TREATMENT/PROGRESS NOTE   Patient: Liliana Coppola (39 y.o. female)   Examination Date: 2025   :  1985 MRN: [id number]   Visit #: 2   Insurance Allowable Auth Needed   mn []Yes    [x]No    Insurance: Payor: [facility] / Plan: [facility] EMPLOYEES / Product Type: *No Product type* /   Insurance ID: [id number] - (Commercial)  Secondary Insurance (if applicable):    Treatment Diagnosis:     ICD-10-CM    1. Left hip pain  M25.552       2. Decreased range of left hip movement  M25.652       3. Decreased strength of lower extremity  R29.898          Medical Diagnosis:  Left snapping hip [M24.852]   Referring Physician: Fermín Hernandez MD  PCP: Rena Agarwal MD     Plan of care signed (Y/N):     Date of Patient follow up with Physician:      Plan of Care Report: NO  POC update due: (10 visits /OR AUTH LIMITS, whichever is less)  2025                                             Medical History:  Comorbidities:  None  Relevant Medical History: unremarkable                                         Precautions/ Contra-indications:           Latex allergy:  NO  Pacemaker:    NO  Contraindications for Manipulation: NA  Date of Surgery: NA  Other:    Red Flags:  None    Suicide Screening:   The patient did not verbalize a primary behavioral concern, suicidal ideation, suicidal intent, or demonstrate suicidal behaviors.    Preferred Language for Healthcare:   [x] English       [] other:    SUBJECTIVE EXAMINATION     Patient stated complaint: Reports that her hip is doing reasonably well.  Reports that she continues to have discomfort with prolonged sitting.  She has been compliant with her home program.  She notes that the exercises are a little uncomfortable along the lateral hip, however does not hinder her.           Test used Initial

## 2025-04-14 ENCOUNTER — HOSPITAL ENCOUNTER (OUTPATIENT)
Dept: PHYSICAL THERAPY | Age: 40
Setting detail: THERAPIES SERIES
Discharge: HOME | End: 2025-04-14
Attending: ORTHOPAEDIC SURGERY
Payer: COMMERCIAL

## 2025-04-14 PROCEDURE — 97110 THERAPEUTIC EXERCISES: CPT | Performed by: SPECIALIST/TECHNOLOGIST

## 2025-04-14 NOTE — FLOWSHEET NOTE
30' 2  EVAL:LOW (43833 - Typically 20 minutes face-to-face)     Neuromusc. Re-ed (70502)    Re-Eval (45133)     Manual (37975)    Estim Unattended (69875)     Ther. Act (98976)    Mech. Traction (82878)     Gait (05983)    Dry Needle 1-2 muscle (05210)     Aquatic Therex (19943)    Dry Needle 3+ muscle (52680)     Iontophoresis (99370)    VASO (33475)     Ultrasound (38846)    Group Therapy (21708)     Estim Attended (69478)    Canalith Repositioning (50864)     Physical Performance Test (06916)    Custom orthotic ()     Other:    Other:    Total Timed Code Tx Minutes 50'        Total Treatment Minutes 8:20-9:10  50'        Charge Justification:  (37590) THERAPEUTIC EXERCISE - Provided verbal/tactile cueing for HEP and/or activities related to strengthening, flexibility, endurance, ROM performed to prevent loss of range of motion, maintain or improve muscular strength or increase flexibility, following either an injury or surgery.     GOALS     Patient stated goal: Able to jog > 20 minutes without pain.  [] Progressing: [] Met: [] Not Met: [] Adjusted    Therapist goals for Patient:   Short Term Goals: To be achieved in: 2 weeks  1Independent in HEP and progression per patient tolerance, in order to prevent re-injury.   [] Progressing: [] Met: [] Not Met: [] Adjusted  Patient will have a decrease in pain to <1/10 to facilitate improvement in movement, function, and ADLs as indicated by Functional Deficits.  [] Progressing: [] Met: [] Not Met: [] Adjusted    IF APPLICABLE:  [] Patient to demonstrate independence in wear and care for custom orthotic device. (Only if applicable for orthotic eval)     Long Term Goals: To be achieved in: 6 weeks  Disability index score of 5% or less for the LEFS to assist with reaching prior level of function with activities such as normal household chores.  [] Progressing: [] Met: [] Not Met: [] Adjusted  Patient will demonstrate increased AROM of left hip to WNL without pain to

## 2025-04-21 ENCOUNTER — HOSPITAL ENCOUNTER (OUTPATIENT)
Dept: PHYSICAL THERAPY | Age: 40
Setting detail: THERAPIES SERIES
Discharge: HOME | End: 2025-04-21
Attending: ORTHOPAEDIC SURGERY
Payer: COMMERCIAL

## 2025-04-21 PROCEDURE — 97110 THERAPEUTIC EXERCISES: CPT | Performed by: PHYSICAL THERAPIST

## 2025-04-21 NOTE — FLOWSHEET NOTE
[facility] - Outpatient Rehabilitation and Therapy: [address], [place] [address] office: [phone] fax: [phone]         Physical Therapy: TREATMENT/PROGRESS NOTE   Patient: Liliana Coppola (39 y.o. female)   Examination Date: 2025   :  1985 MRN: [id number]   Visit #: 4   Insurance Allowable Auth Needed   mn []Yes    [x]No    Insurance: Payor: [facility] / Plan: [facility] EMPLOYEES / Product Type: *No Product type* /   Insurance ID: [id number] - (Commercial)  Secondary Insurance (if applicable):    Treatment Diagnosis:     ICD-10-CM    1. Left hip pain  M25.552       2. Decreased range of left hip movement  M25.652       3. Decreased strength of lower extremity  R29.898          Medical Diagnosis:  Left snapping hip [M24.852]   Referring Physician: Fermín Hernandez MD  PCP: Rena Agarwal MD     Plan of care signed (Y/N):     Date of Patient follow up with Physician:      Plan of Care Report: NO  POC update due: (10 visits /OR AUTH LIMITS, whichever is less)  2025                                             Medical History:  Comorbidities:  None  Relevant Medical History: unremarkable                                         Precautions/ Contra-indications:           Latex allergy:  NO  Pacemaker:    NO  Contraindications for Manipulation: NA  Date of Surgery: NA  Other:    Red Flags:  None    Suicide Screening:   The patient did not verbalize a primary behavioral concern, suicidal ideation, suicidal intent, or demonstrate suicidal behaviors.    Preferred Language for Healthcare:   [x] English       [] other:    SUBJECTIVE EXAMINATION     Patient stated complaint: Pt. Reports prolong sitting and being really active will cause her pain levels to increase.  Pt. Reports she has no problems sleeping through the night.  Notes that she exercises seem to make her glutes sore.  Otherwise she is tolerating everything well.           Test used Initial score  2025

## 2025-04-29 ENCOUNTER — HOSPITAL ENCOUNTER (OUTPATIENT)
Dept: PHYSICAL THERAPY | Age: 40
Setting detail: THERAPIES SERIES
Discharge: HOME | End: 2025-04-29
Attending: ORTHOPAEDIC SURGERY
Payer: COMMERCIAL

## 2025-04-29 PROCEDURE — 97110 THERAPEUTIC EXERCISES: CPT | Performed by: PHYSICAL THERAPIST

## 2025-04-29 NOTE — FLOWSHEET NOTE
[facility]: [address], [place] [address] office: [phone] fax: [phone]         Physical Therapy: TREATMENT/PROGRESS NOTE   Patient: Liliana Coppola (39 y.o. female)   Examination Date: 2025   :  1985 MRN: [id number]   Visit #: 5   Insurance Allowable Auth Needed   mn []Yes    [x]No    Insurance: Payor: [facility] / Plan: [facility] EMPLOYEES / Product Type: *No Product type* /   Insurance ID: [id number] - (Commercial)  Secondary Insurance (if applicable):    Treatment Diagnosis:     ICD-10-CM    1. Left hip pain  M25.552       2. Decreased range of left hip movement  M25.652       3. Decreased strength of lower extremity  R29.898          Medical Diagnosis:  Left snapping hip [M24.852]   Referring Physician: Fermín Hernandez MD  PCP: Rena Agarwal MD     Plan of care signed (Y/N):     Date of Patient follow up with Physician:      Plan of Care Report: NO  POC update due: (10 visits /OR AUTH LIMITS, whichever is less)  2025                                             Medical History:  Comorbidities:  None  Relevant Medical History: unremarkable                                         Precautions/ Contra-indications:           Latex allergy:  NO  Pacemaker:    NO  Contraindications for Manipulation: NA  Date of Surgery: NA  Other:    Red Flags:  None    Suicide Screening:   The patient did not verbalize a primary behavioral concern, suicidal ideation, suicidal intent, or demonstrate suicidal behaviors.    Preferred Language for Healthcare:   [x] English       [] other:    SUBJECTIVE EXAMINATION     Patient stated complaint: Pt. Reports prolong sitting and being really active will cause her pain levels to increase.  Pt. Reports she has no problems sleeping through the night.  Notes that she exercises seem to make her glutes sore.  Otherwise she is tolerating everything well.  She notes that she does note increased soreness along

## 2025-05-12 ENCOUNTER — HOSPITAL ENCOUNTER (OUTPATIENT)
Dept: PHYSICAL THERAPY | Age: 40
Setting detail: THERAPIES SERIES
Discharge: HOME | End: 2025-05-12
Attending: ORTHOPAEDIC SURGERY
Payer: COMMERCIAL

## 2025-05-12 PROCEDURE — 97110 THERAPEUTIC EXERCISES: CPT | Performed by: PHYSICAL THERAPIST

## 2025-05-12 NOTE — PLAN OF CARE
[facility]: [address], [place] [address] office: [phone] fax: [phone]     Physical Therapy Re-Certification Plan of Care    Dear Fermín Hernandez MD  ,    We had the pleasure of treating the following patient for physical therapy services at [facility]. A summary of our findings can be found in the updated assessment below.  This includes our plan of care.  If you have any questions or concerns regarding these findings, please do not hesitate to contact me at the office phone number checked above.  Thank you for the referral.     Physician Signature:________________________________Date:__________________  By signing above (or electronic signature), therapist's plan is approved by physician      Total Visits: 6     Overall Response to Treatment:  Patient is responding well to treatment and improvement is noted with regards to goals  Patient demonstrates independence with current program and we do feel like she is ready to continue with an independent home program.  Patient no longer requires skilled PT.  Patient to contact PT prn.    Recommendation:    [] Continue PT x / wk for  weeks.   [] Hold PT, pending MD visit     [x] Discharge to [facility]. Follow up with PT or MD PRN.      Physical Therapy: TREATMENT/PROGRESS NOTE   Patient: Liliana Coppola (40 y.o. female)   Examination Date: 2025   :  1985 MRN: [id number]   Visit #: 6   Insurance Allowable Auth Needed   mn []Yes    [x]No    Insurance: Payor: [facility] / Plan: [facility] EMPLOYEES / Product Type: *No Product type* /   Insurance ID: [id number] - (Commercial)  Secondary Insurance (if applicable):    Treatment Diagnosis:     ICD-10-CM    1. Left hip pain  M25.552       2. Decreased range of left hip movement  M25.652       3. Decreased strength of lower extremity  R29.898          Medical Diagnosis:  Left snapping hip [M24.852]  Left hip pain [M25.552]

## 2025-05-20 ENCOUNTER — OFFICE VISIT (OUTPATIENT)
Dept: URGENT CARE | Age: 40
End: 2025-05-20

## 2025-05-20 VITALS
HEART RATE: 68 BPM | TEMPERATURE: 97.9 F | SYSTOLIC BLOOD PRESSURE: 116 MMHG | WEIGHT: 133.8 LBS | BODY MASS INDEX: 21.6 KG/M2 | OXYGEN SATURATION: 98 % | DIASTOLIC BLOOD PRESSURE: 79 MMHG

## 2025-05-20 DIAGNOSIS — H61.22 IMPACTED CERUMEN OF LEFT EAR: ICD-10-CM

## 2025-05-20 DIAGNOSIS — J34.89 RHINORRHEA: ICD-10-CM

## 2025-05-20 DIAGNOSIS — J01.90 ACUTE BACTERIAL SINUSITIS: Primary | ICD-10-CM

## 2025-05-20 DIAGNOSIS — R09.82 POSTNASAL DRIP: ICD-10-CM

## 2025-05-20 DIAGNOSIS — R09.81 NASAL CONGESTION: ICD-10-CM

## 2025-05-20 DIAGNOSIS — R05.2 SUBACUTE COUGH: ICD-10-CM

## 2025-05-20 DIAGNOSIS — B96.89 ACUTE BACTERIAL SINUSITIS: Primary | ICD-10-CM

## 2025-05-20 RX ORDER — AZITHROMYCIN 250 MG/1
TABLET, FILM COATED ORAL
Qty: 6 TABLET | Refills: 0 | Status: SHIPPED | OUTPATIENT
Start: 2025-05-20 | End: 2025-05-30

## 2025-05-20 ASSESSMENT — ENCOUNTER SYMPTOMS
COUGH: 1
SINUS PAIN: 0
CHEST TIGHTNESS: 0
WHEEZING: 0
SINUS PRESSURE: 0
VOICE CHANGE: 0
RHINORRHEA: 0
NAUSEA: 0
SORE THROAT: 0
DIARRHEA: 0
SHORTNESS OF BREATH: 0
VOMITING: 0
ABDOMINAL PAIN: 0

## 2025-05-20 ASSESSMENT — VISUAL ACUITY: OU: 1

## 2025-05-20 NOTE — PROGRESS NOTES
Liliana Coppola (: 1985) is a 40 y.o. female, New patient, here for evaluation of the following chief complaint(s):  Cough (Cough on and off for 2 months, congestion, left ear clogged feeling for 3 weeks )      ASSESSMENT/PLAN:    ICD-10-CM    1. Acute bacterial sinusitis  J01.90 azithromycin (ZITHROMAX) 250 MG tablet    B96.89 Pseudoephedrine-DM-GG 60- MG TABS      2. Impacted cerumen of left ear  H61.22 REMOVAL IMPACTED CERUMEN IRRIGATION/LVG UNILAT      3. Nasal congestion  R09.81 Pseudoephedrine-DM-GG 60- MG TABS      4. Rhinorrhea  J34.89 Pseudoephedrine-DM-GG 60- MG TABS      5. Postnasal drip  R09.82 Pseudoephedrine-DM-GG 60- MG TABS      6. Subacute cough  R05.2 Pseudoephedrine-DM-GG 60- MG TABS          - Acute Bacterial Sinusitis:   Given the history of persistent/worsening symptoms for over 2 months, including nasal congestion, throat drainage, cough, and ear pain/pressure and exam findings of congestion, postnasal drip with thickened, purulent appearing mucus, tympanic membrane retractions, and pharyngeal erythema, there is concern for bacterial sinusitis complicating an underlying viral URI.  Low concern for otitis media, Strep pharyngitis, respiratory distress, pneumonia, bronchitis, and PE.  Azithromycin prescribed for antibiotic treatment.  Capmist is prescribed for cough and congestion relief with expectorant therapy.  Recommended OTC medication and home remedy treatments for symptomatic relief  Strict ED follow up instructions provided      - Cerumen Impaction of the Left Ear:  Exam concerning for full impaction of the left external auditory canal, with visualization of the left TM completely obstructed. Given history, this is likely the cause of the pt's current complaint.  Low concern for otitis media, TM perforation, otitis externa, and abscess.  Cerumen irrigation was performed of the left ear, as noted below in procedures, by EDWARD Benavidez, was

## 2025-05-20 NOTE — PATIENT INSTRUCTIONS
Liliana,    Thank you for trusting [facility] with your care. Your decision to come to us means a lot and we are honored to be part of your healthcare journey. We value your trust and hope your experience with us was positive and met your expectations.    We're always looking for ways to improve, and your feedback is incredibly important to us. You will receive a text or email soon asking you how your visit went. for If you could take a moment to share your thoughts, it would mean the world to us. Your input helps us better serve you and others in the community.     Thank you again for choosing us. We're grateful for the opportunity to care for you and your loved ones. We hope to see you again - though we always wish you health and wellness!    Warm regards,    [facility]    Niranjan Alston PA-C, Myra (Radiation Tech, Registration), and Melissa (Medical Assistant)      Wax irrigation was successful today.  Do not use any cotton swabs to clean your ears.  Recommend light washing with mild soap and water during showers.  Can use debrox or a few drops of mild soapy water to soften your ear wax should you start to experience symptoms again, then return to the clinic, your PCP, or an ENT office to have your ears cleaned/irrigated again.    Azithromycin is prescribed for antibiotic treatment of the sinus infection  Take the antibiotics until completed, do not stop unless otherwise directed by a healthcare provider.  Recommend daily yogurt or other probiotics while on antibiotics.  Capmist prescribed for cough and congestion relief with expectorant therapy  Do not take other decongestants, Mucinex, or cough medications while on this medication.    Recommend OTC treatment for symptoms:  ibuprofen (Advil, Motrin) and acetaminophen (Tylenol) for fevers and pain relief.  decongestants (specifically pseudoephedrine - found behind the pharmacy counter - does not need a prescription) <avoid

## 2025-06-02 ENCOUNTER — PROCEDURE VISIT (OUTPATIENT)
Dept: AUDIOLOGY | Age: 40
End: 2025-06-02

## 2025-06-02 ENCOUNTER — OFFICE VISIT (OUTPATIENT)
Dept: ENT CLINIC | Age: 40
End: 2025-06-02
Payer: COMMERCIAL

## 2025-06-02 VITALS
DIASTOLIC BLOOD PRESSURE: 67 MMHG | HEIGHT: 67 IN | BODY MASS INDEX: 20.81 KG/M2 | WEIGHT: 132.6 LBS | TEMPERATURE: 97.7 F | HEART RATE: 71 BPM | SYSTOLIC BLOOD PRESSURE: 101 MMHG

## 2025-06-02 DIAGNOSIS — H61.22 IMPACTED CERUMEN OF LEFT EAR: Primary | ICD-10-CM

## 2025-06-02 DIAGNOSIS — H61.23 BILATERAL IMPACTED CERUMEN: Primary | ICD-10-CM

## 2025-06-02 PROCEDURE — 69210 REMOVE IMPACTED EAR WAX UNI: CPT | Performed by: OTOLARYNGOLOGY

## 2025-06-02 NOTE — PROGRESS NOTES
Liliana Coppola   1985, 40 y.o. female   [id number]       Referring Provider: Rena Agarwal MD   Referral Type: In an order in Epic    Reason for Visit: Evaluation of suspected change in hearing, tinnitus, or balance.    ADULT AUDIOLOGIC EVALUATION      Liliana Coppola is a 40 y.o. female seen today, 6/2/2025 , for an initial audiologic evaluation.  Patient was seen by Inocente Hill MD, PhD following today's evaluation.    AUDIOLOGIC AND OTHER PERTINENT MEDICAL HISTORY:      Liliana Coppola reports left ear was irrigated at her PCP's office on 5/20. She says she notes some improvement but still has some fullness. .     She denied otorrhea, tinnitus, dizziness, history of noise exposure, and family history of hearing loss    Date: 6/2/2025     IMPRESSIONS:      No audiogram performed today. Otoscopy revealed significant cerumen impaction of the left ear. Patient noted subjective improvement after ear cleaning was performed by Dr. Hill. Follow up as needed.    ASSESSMENT AND FINDINGS:     Otoscopy revealed significant occlusion in the left ear .Ear cleaning performed by Dr. Hill. No audiogram needed per patient and Dr. Hill post ear cleaning.  RECOMMENDATIONS:                                                                                                                                                                                                                                                            The following items are recommended based on patient report and results from today's appointment:   - Continue medical follow-up with Inocente Hill MD, PhD.   - Test hearing as medically indicated and/or sooner if a change in hearing is noted.    Trish Mosley  Audiologist    Chart CC'd to: Rena Agarwal MD       Degree of   Hearing Sensitivity dB Range   Within Normal Limits (WNL) 0 - 20   Mild 20 - 40   Moderate 40 - 55   Moderately-Severe 55 - 70   Severe 70 - 90   Profound 90 +     Portions of this

## 2025-07-08 ENCOUNTER — OFFICE VISIT (OUTPATIENT)
Dept: URGENT CARE | Age: 40
End: 2025-07-08

## 2025-07-08 VITALS
TEMPERATURE: 98 F | BODY MASS INDEX: 21.03 KG/M2 | HEART RATE: 71 BPM | SYSTOLIC BLOOD PRESSURE: 107 MMHG | DIASTOLIC BLOOD PRESSURE: 75 MMHG | HEIGHT: 67 IN | OXYGEN SATURATION: 98 % | WEIGHT: 134 LBS

## 2025-07-08 DIAGNOSIS — J06.9 VIRAL URI: Primary | ICD-10-CM

## 2025-07-08 DIAGNOSIS — J02.9 SORE THROAT: ICD-10-CM

## 2025-07-08 LAB — S PYO AG THROAT QL: NORMAL

## 2025-07-08 ASSESSMENT — ENCOUNTER SYMPTOMS
ABDOMINAL PAIN: 0
EYE DISCHARGE: 0
VOMITING: 0
DIARRHEA: 1
SORE THROAT: 1
SHORTNESS OF BREATH: 0
EYE REDNESS: 0
COUGH: 0
NAUSEA: 0
EYE PAIN: 0

## 2025-07-08 NOTE — PROGRESS NOTES
Liliana Coppola (: 1985) is a 40 y.o. female, Established patient, here for evaluation of the following chief complaint(s):  Sore Throat (Patient complains of headache, sore throat, diarrhea (yesterday) + bodyaches. Symptoms started yesterday mid to late morning.)      ASSESSMENT/PLAN:    ICD-10-CM    1. Viral URI  J06.9       2. Sore throat  J02.9 POCT rapid strep A          - Discussed with patient that symptoms and physical exam findings are most consistent with a viral URI. Pt was told there is low concern for strep pharyngitis but pt states she would like to have a rapid strep test done. Pt is negative for strep. Pt did not want any other testing done. Low concern for strep pharyngitis, otitis media, bacterial pneumonia, bronchitis, sinusitis or sepsis.  - Pt to drink lots of fluids  - Pt to take medication as prescribed  - Pt ok to take tylenol and ibuprofen as needed  - Pt to call if any symptoms worsen or follow up with PCP if not better in 7 days  - Pt to go to ER if have shortness of breath, chest pain, sudden fever, abdominal pain or lethargy  - Pt told if get fever to isolate until fever free for 24 hours without fever reducers    Discussed PCP follow up for persisting or worsening symptoms, or to return to the clinic if unable to obtain PCP follow up for worsening symptoms.      The patient tolerated their visit well.      SUBJECTIVE/OBJECTIVE:  HPI:   40 y.o. female presents alone for complaint of pt states she started yesterday with a sore throat and body aches.     Admits headache and diarrhea.     Denies nasal congestion, cough, abdominal pain or vomiting.    Has taken Pepto bismol at home. No known sick contacts.    Patient provided the HPI by themself - the patient is considered to be a reliable historian.        History provided by:  Patient   used: No      VITAL SIGNS  Vitals:    25 1019   BP: 107/75   Pulse: 71   Temp: 98 °F (36.7 °C)   TempSrc: Oral   SpO2:

## 2025-08-07 ENCOUNTER — OFFICE VISIT (OUTPATIENT)
Dept: GYNECOLOGY | Age: 40
End: 2025-08-07

## 2025-08-07 VITALS
HEART RATE: 81 BPM | WEIGHT: 134.8 LBS | OXYGEN SATURATION: 99 % | SYSTOLIC BLOOD PRESSURE: 116 MMHG | HEIGHT: 67 IN | DIASTOLIC BLOOD PRESSURE: 74 MMHG | BODY MASS INDEX: 21.16 KG/M2

## 2025-08-07 DIAGNOSIS — Z01.419 WELL WOMAN EXAM WITH ROUTINE GYNECOLOGICAL EXAM: Primary | ICD-10-CM

## 2025-08-07 RX ORDER — MULTIVITAMIN WITH IRON
1 TABLET ORAL DAILY
COMMUNITY

## 2025-08-07 ASSESSMENT — ENCOUNTER SYMPTOMS
GASTROINTESTINAL NEGATIVE: 1
EYES NEGATIVE: 1
ALLERGIC/IMMUNOLOGIC NEGATIVE: 1
RESPIRATORY NEGATIVE: 1

## 2025-08-07 ASSESSMENT — PATIENT HEALTH QUESTIONNAIRE - PHQ9
SUM OF ALL RESPONSES TO PHQ QUESTIONS 1-9: 0
SUM OF ALL RESPONSES TO PHQ QUESTIONS 1-9: 0
2. FEELING DOWN, DEPRESSED OR HOPELESS: NOT AT ALL
1. LITTLE INTEREST OR PLEASURE IN DOING THINGS: NOT AT ALL
SUM OF ALL RESPONSES TO PHQ QUESTIONS 1-9: 0
SUM OF ALL RESPONSES TO PHQ QUESTIONS 1-9: 0